# Patient Record
Sex: FEMALE | Race: BLACK OR AFRICAN AMERICAN | HISPANIC OR LATINO | Employment: FULL TIME | URBAN - METROPOLITAN AREA
[De-identification: names, ages, dates, MRNs, and addresses within clinical notes are randomized per-mention and may not be internally consistent; named-entity substitution may affect disease eponyms.]

---

## 2022-10-17 ENCOUNTER — OFFICE VISIT (OUTPATIENT)
Dept: OBSTETRICS AND GYNECOLOGY | Facility: CLINIC | Age: 37
End: 2022-10-17

## 2022-10-17 VITALS
DIASTOLIC BLOOD PRESSURE: 65 MMHG | HEART RATE: 84 BPM | SYSTOLIC BLOOD PRESSURE: 108 MMHG | BODY MASS INDEX: 20.09 KG/M2 | WEIGHT: 128 LBS | HEIGHT: 67 IN

## 2022-10-17 DIAGNOSIS — Z30.09 ENCOUNTER FOR OTHER GENERAL COUNSELING OR ADVICE ON CONTRACEPTION: ICD-10-CM

## 2022-10-17 DIAGNOSIS — N92.1 BREAKTHROUGH BLEEDING ON DEPO PROVERA: ICD-10-CM

## 2022-10-17 DIAGNOSIS — Z01.419 ENCOUNTER FOR GYNECOLOGICAL EXAMINATION WITHOUT ABNORMAL FINDING: Primary | ICD-10-CM

## 2022-10-17 PROCEDURE — 99385 PREV VISIT NEW AGE 18-39: CPT | Performed by: OBSTETRICS & GYNECOLOGY

## 2022-10-17 PROCEDURE — 99213 OFFICE O/P EST LOW 20 MIN: CPT | Performed by: OBSTETRICS & GYNECOLOGY

## 2022-10-17 PROCEDURE — 2014F MENTAL STATUS ASSESS: CPT | Performed by: OBSTETRICS & GYNECOLOGY

## 2022-10-17 RX ORDER — DOXYCYCLINE 100 MG/1
100 CAPSULE ORAL 2 TIMES DAILY
Qty: 20 CAPSULE | Refills: 0 | Status: SHIPPED | OUTPATIENT
Start: 2022-10-17 | End: 2022-10-27

## 2022-10-17 NOTE — PROGRESS NOTES
GYN Annual Exam     CC- Here for annual exam.     Mesha Sanchez is a 37 y.o. female who presents for annual well woman exam. Periods are regular every 28-30 days, lasting 7 days. Dysmenorrhea:moderate, occurring premenstrually and first 1-2 days of flow. Cyclic symptoms include none.   Pt c/o irregular bleeding for the past 3 mths. She received a birth control injection in [place] 3 mthd ago and has had irregular bleeding since.     Menarche at 14 years of age.   Reports on contraceptives for awhile   Was on IUD up until a few months ago. (approximately 5 months ago for pelvic pain, inflammation)   Then did injection   Started having irregular bleeding with this injection       OB History        6    Para   1    Term                AB   5    Living   1       SAB        IAB        Ectopic        Molar        Multiple        Live Births   1                Current contraception: none  History of abnormal Pap smear: no  Family history of uterine, colon or ovarian cancer: no  History of abnormal mammogram: no  Family history of breast cancer: no  Last Pap : unsure    Past Medical History:   Diagnosis Date   • Anxiety    • Fibroid    • Hyperemesis gravidarum        Past Surgical History:   Procedure Laterality Date   • CARPAL TUNNEL RELEASE           Current Outpatient Medications:   •  doxycycline (MONODOX) 100 MG capsule, Take 1 capsule by mouth 2 (Two) Times a Day for 10 days., Disp: 20 capsule, Rfl: 0    No Known Allergies    Social History     Tobacco Use   • Smoking status: Some Days     Types: Electronic Cigarette   Substance Use Topics   • Alcohol use: Never   • Drug use: Never       Family History   Problem Relation Age of Onset   • Diabetes Mother    • Hypertension Mother    • Breast cancer Neg Hx    • Ovarian cancer Neg Hx    • Uterine cancer Neg Hx    • Colon cancer Neg Hx    • Deep vein thrombosis Neg Hx    • Pulmonary embolism Neg Hx        Review of Systems   Constitutional:  "Negative for chills and fever.   Gastrointestinal: Negative for abdominal pain, constipation and diarrhea.   Genitourinary: Positive for menstrual problem. Negative for pelvic pain, vaginal bleeding and vaginal discharge.   All other systems reviewed and are negative.      /65   Pulse 84   Ht 170.2 cm (67\")   Wt 58.1 kg (128 lb)   BMI 20.05 kg/m²     Physical Exam  Constitutional:       General: She is not in acute distress.     Appearance: She is well-developed and normal weight.   Genitourinary:      Vulva normal.      Right Labia: No lesions or Bartholin's cyst.     Left Labia: No lesions or Bartholin's cyst.     No inguinal adenopathy present in the right or left side.     Vaginal bleeding (small amount of old blood ) present.      No vaginal discharge.        Right Adnexa: not tender, not full and no mass present.     Left Adnexa: not tender, not full and no mass present.     Cervical motion tenderness present.      No cervical friability.      Uterus is tender.      Uterus is not enlarged.      No uterine mass detected.     Uterus is anteverted.   Breasts:     Right: No inverted nipple, mass or nipple discharge.      Left: No inverted nipple, mass or nipple discharge.   HENT:      Head: Normocephalic and atraumatic.      Nose: Nose normal.   Eyes:      Conjunctiva/sclera: Conjunctivae normal.      Pupils: Pupils are equal, round, and reactive to light.   Neck:      Thyroid: No thyromegaly.   Cardiovascular:      Rate and Rhythm: Normal rate and regular rhythm.      Heart sounds: Normal heart sounds. No murmur heard.  Pulmonary:      Effort: Pulmonary effort is normal. No respiratory distress.      Breath sounds: Normal breath sounds.   Abdominal:      General: Abdomen is flat. There is no distension.      Palpations: Abdomen is soft.      Tenderness: There is no abdominal tenderness.   Musculoskeletal:         General: No deformity. Normal range of motion.      Cervical back: Normal range of motion " and neck supple.      Right lower leg: No edema.      Left lower leg: No edema.   Lymphadenopathy:      Lower Body: No right inguinal adenopathy. No left inguinal adenopathy.   Neurological:      Mental Status: She is alert and oriented to person, place, and time.   Skin:     General: Skin is warm and dry.      Findings: No erythema.   Psychiatric:         Behavior: Behavior normal.         Thought Content: Thought content normal.         Judgment: Judgment normal.   Vitals reviewed. Exam conducted with a chaperone present.               Assessment     Diagnoses and all orders for this visit:    1. Encounter for gynecological examination without abnormal finding (Primary)  -     IGP, Apt HPV,rfx 16 / 18,45    2. Breakthrough bleeding on depo provera  -     Urine Culture - , Urine, Clean Catch  -     Chlamydia trachomatis, Neisseria gonorrhoeae, Trichomonas vaginalis, PCR - Swab, Cervix  -     US Non-ob Transvaginal    3. Encounter for other general counseling or advice on contraception    Other orders  -     doxycycline (MONODOX) 100 MG capsule; Take 1 capsule by mouth 2 (Two) Times a Day for 10 days.  Dispense: 20 capsule; Refill: 0    1) GYN exam   Expectations reviewed.     2) BTB/AUB   Reports removed IUD 5 months ago due to inflammation   Told she could be allergic to the copper (inflammation in tubes)   Given injection (suspect depo-provera) - all in geo  Remote history of fibroids   Tender on exam   Check cultures of urine and vagina  Check ultrasound   Empiric doxycycline for inflammation   Return for next step     3) Contraceptive options   Continue DMPA, trial IUD (with progesterone) or Nexplaon - but concern with bleeding pattern   Not candidate with age for OCP or NuvaRign (smoker)      Plan     1) Breast Health - Clinical breast exam yearly, Discussed American cancer society recommendations for breast cancer screening, and Self breast awareness monthly  2) Pap - updated today   3) Smoking status-  cessation encouraged.   4) Encouraged to be wary of information obtained via social media and internet based on source and search.  5) Follow up prn and one year.       Russ Obrien MD   10/17/2022  15:36 EDT

## 2022-10-19 ENCOUNTER — TELEPHONE (OUTPATIENT)
Dept: OBSTETRICS AND GYNECOLOGY | Facility: CLINIC | Age: 37
End: 2022-10-19

## 2022-10-19 LAB
BACTERIA UR CULT: NO GROWTH
BACTERIA UR CULT: NORMAL
C TRACH RRNA SPEC QL NAA+PROBE: NEGATIVE
N GONORRHOEA RRNA SPEC QL NAA+PROBE: NEGATIVE
T VAGINALIS RRNA SPEC QL NAA+PROBE: NEGATIVE

## 2022-10-19 NOTE — TELEPHONE ENCOUNTER
----- Message from Russ Obrien MD sent at 10/19/2022  8:59 AM EDT -----  Georgiana, normal urine culture. Please let her know. Thanks Dr. Obrien

## 2022-10-19 NOTE — TELEPHONE ENCOUNTER
----- Message from Russ Obrien MD sent at 10/19/2022  4:34 PM EDT -----  Georgiana, please let her know the STD screen for Chlamydia, Gonorrhea and trichomoniasis is negative. Thanks, Dr. Obrien

## 2022-10-21 LAB
CYTOLOGIST CVX/VAG CYTO: NORMAL
CYTOLOGY CVX/VAG DOC CYTO: NORMAL
CYTOLOGY CVX/VAG DOC THIN PREP: NORMAL
DX ICD CODE: NORMAL
HIV 1 & 2 AB SER-IMP: NORMAL
HPV I/H RISK 4 DNA CVX QL PROBE+SIG AMP: NEGATIVE
OTHER STN SPEC: NORMAL
STAT OF ADQ CVX/VAG CYTO-IMP: NORMAL

## 2022-11-01 ENCOUNTER — TELEPHONE (OUTPATIENT)
Dept: OBSTETRICS AND GYNECOLOGY | Facility: CLINIC | Age: 37
End: 2022-11-01

## 2022-11-01 NOTE — TELEPHONE ENCOUNTER
Georgiana,     GYN ultrasound   Done for bleeding,   Per her possible history of hydrosalpinx and fibroids  Exam was normal.   No evidence of either of above.   Normal appearing uterus.     Keep follow up in December,     Thanks,   Dr. Obrien

## 2022-12-14 ENCOUNTER — PROCEDURE VISIT (OUTPATIENT)
Dept: OBSTETRICS AND GYNECOLOGY | Facility: CLINIC | Age: 37
End: 2022-12-14

## 2022-12-14 VITALS
HEIGHT: 67 IN | WEIGHT: 130 LBS | SYSTOLIC BLOOD PRESSURE: 98 MMHG | DIASTOLIC BLOOD PRESSURE: 57 MMHG | HEART RATE: 79 BPM | BODY MASS INDEX: 20.4 KG/M2

## 2022-12-14 DIAGNOSIS — Z30.430 ENCOUNTER FOR INSERTION OF MIRENA IUD: Primary | ICD-10-CM

## 2022-12-14 LAB
B-HCG UR QL: NEGATIVE
EXPIRATION DATE: NORMAL
INTERNAL NEGATIVE CONTROL: NEGATIVE
INTERNAL POSITIVE CONTROL: POSITIVE
Lab: NORMAL

## 2022-12-14 PROCEDURE — 81025 URINE PREGNANCY TEST: CPT | Performed by: OBSTETRICS & GYNECOLOGY

## 2022-12-14 PROCEDURE — 58300 INSERT INTRAUTERINE DEVICE: CPT | Performed by: OBSTETRICS & GYNECOLOGY

## 2022-12-14 NOTE — PROGRESS NOTES
Procedure: Mirena Intrauterine device insertion    Pre procedure indication 1) Desires Mirena  Post procedure indication 1) Desires Mirena     Brief Urine Lab Results  (Last result in the past 365 days)      Color   Clarity   Blood   Leuk Est   Nitrite   Protein   CREAT   Urine HCG        12/14/22 1511               Negative               The risks, benefits, and alternatives to Mirena were explained at length with the patient. All her questions were answered and consents were signed.LOT# [id number], exp 12/30/2024. NDC# [id number]    The patient was placed in a dorsal lithotomy position on the examining table in [facility]. A bimanual exam confirmed the uterus was normal in size, retroverted. A warmed metal speculum was inserted into the vagina and the cervix was brought into view.    The cervix was prepped with Betadine. The anterior lip was grasped with a single-tooth tenaculum. The endometrial cavity was then sounded to 7 cm without use of a dilator. This sealed Mirena package was opened and the Mirena was removed in a sterile fashion.    The upper edge of the depth setting the flange was set at a uterine sound measured. The  was then carefully advanced to the cervical canal into the uterus to the level of the fundus. This was then backed off about 1.5-2 cm to allow sufficient space for the arms to open. The slider was then retracted about 1 cm and deployed the device. The device was then gently advanced to the fundus. The Mirena was then released by pulling the slider down all the way. The  was removed carefully from the uterus. The threads were then cut leaving 2-3 cm visible outside of the cervix.  The single-tooth tenaculum was removed from the anterior lip. Good hemostasis was noted.     All other instruments were removed from the vagina.   There were no complications.  The patient tolerated the procedure well with a minimal amount of discomfort.    The patient was counseled about the need to  return in 2 weeks for string check.     She was counseled about the need to use a backup method of contraception such as condoms until her post insertion exam was performed. The patient verbalized understanding that the Mirena will need to be removed/replaced after 5 years. The patient is counseled to contact us if she has any significant or increasing bleeding, pain, fever, chills, or other concerns. She is instructed to see a doctor right away if she believes that she may be pregnant at any time with the Mirena in place.    Russ Obrien MD  12/14/2022  15:46 EST

## 2023-01-18 ENCOUNTER — TELEPHONE (OUTPATIENT)
Dept: OBSTETRICS AND GYNECOLOGY | Facility: CLINIC | Age: 38
End: 2023-01-18

## 2023-01-18 ENCOUNTER — OFFICE VISIT (OUTPATIENT)
Dept: OBSTETRICS AND GYNECOLOGY | Facility: CLINIC | Age: 38
End: 2023-01-18
Payer: COMMERCIAL

## 2023-01-18 VITALS
DIASTOLIC BLOOD PRESSURE: 65 MMHG | BODY MASS INDEX: 19.93 KG/M2 | HEART RATE: 78 BPM | WEIGHT: 127 LBS | HEIGHT: 67 IN | SYSTOLIC BLOOD PRESSURE: 109 MMHG

## 2023-01-18 DIAGNOSIS — Z97.5 BREAKTHROUGH BLEEDING WITH IUD: ICD-10-CM

## 2023-01-18 DIAGNOSIS — N92.1 BREAKTHROUGH BLEEDING WITH IUD: ICD-10-CM

## 2023-01-18 DIAGNOSIS — Z30.431 IUD CHECK UP: Primary | ICD-10-CM

## 2023-01-18 DIAGNOSIS — T83.32XA INTRAUTERINE CONTRACEPTIVE DEVICE THREADS LOST, INITIAL ENCOUNTER: ICD-10-CM

## 2023-01-18 PROCEDURE — 99213 OFFICE O/P EST LOW 20 MIN: CPT | Performed by: OBSTETRICS & GYNECOLOGY

## 2023-01-18 NOTE — PROGRESS NOTES
"Subjective    is a 37 y.o. female following up from IUD insertion on 2022. Pt c/o pelvic pain and dark brown bleeding with an odor after intercourse.     Chief Complaint   Patient presents with   • Follow-up        HPI     37 y.o.    IUD check up from December   Having bleeding and pelvic pain post placement       Review of Systems   Constitutional: Negative for fever.   Gastrointestinal: Negative for abdominal pain.   Genitourinary: Positive for pelvic pain and vaginal bleeding. Negative for dysuria and vaginal discharge.        Objective   /65   Pulse 78   Ht 170.2 cm (67\")   Wt 57.6 kg (127 lb)   BMI 19.89 kg/m²   Physical Exam  Constitutional:       General: She is not in acute distress.     Appearance: Normal appearance. She is well-developed and normal weight.   Genitourinary:      Right Labia: No lesions or Bartholin's cyst.     Left Labia: No lesions or Bartholin's cyst.     No inguinal adenopathy present in the right or left side.     No vaginal discharge or bleeding.        Right Adnexa: not tender, not full and no mass present.     Left Adnexa: not tender, not full and no mass present.     No cervical motion tenderness or friability.      No IUD strings visualized.      Uterus is not enlarged or tender.      No uterine mass detected.     Uterus is retroverted.   Abdominal:      General: Abdomen is flat. There is no distension.      Palpations: Abdomen is soft.      Tenderness: There is no abdominal tenderness.   Lymphadenopathy:      Lower Body: No right inguinal adenopathy. No left inguinal adenopathy.   Neurological:      Mental Status: She is alert.   Vitals reviewed. Exam conducted with a chaperone present.          Assessment/Plan   Diagnoses and all orders for this visit:    1. IUD check up (Primary)  -     US Non-ob Transvaginal    2. Breakthrough bleeding with IUD  -     US Non-ob Transvaginal    3. Intrauterine contraceptive device threads lost, initial encounter  -     US " Non-ob Transvaginal    Having some difficulty with IUD   Not sure if adjustment or placement issue   No strings on exam so check ultrasound to confirm in uterus,   Follow up based on those results.     Russ Obrien MD   1/18/2023  13:13 EST

## 2023-01-19 NOTE — TELEPHONE ENCOUNTER
Georgiana,     Post visit did GYN scan   IUD is in place, so suspect her issues are just her body adjusting to the device. I would try and give it 3 months total. If not better at that point should remove if bothering her bad enough and we can consider other options.   But for now okay to leave in place.     Thanks,   Dr. Obrien

## 2023-10-18 ENCOUNTER — OFFICE VISIT (OUTPATIENT)
Dept: OBSTETRICS AND GYNECOLOGY | Facility: CLINIC | Age: 38
End: 2023-10-18
Payer: COMMERCIAL

## 2023-10-18 VITALS
DIASTOLIC BLOOD PRESSURE: 64 MMHG | HEART RATE: 73 BPM | WEIGHT: 126 LBS | BODY MASS INDEX: 19.78 KG/M2 | HEIGHT: 67 IN | SYSTOLIC BLOOD PRESSURE: 98 MMHG

## 2023-10-18 DIAGNOSIS — Z01.419 ENCOUNTER FOR GYNECOLOGICAL EXAMINATION WITHOUT ABNORMAL FINDING: Primary | ICD-10-CM

## 2023-10-18 DIAGNOSIS — N63.12 MASS OF UPPER INNER QUADRANT OF RIGHT BREAST: ICD-10-CM

## 2023-10-18 DIAGNOSIS — N64.4 BREAST TENDERNESS IN FEMALE: ICD-10-CM

## 2023-10-18 NOTE — Clinical Note
Diane, can we set her up with [facility] for MMG and ultrasound due to new breast mass. Thanks, Dr. Obrien

## 2023-10-18 NOTE — PROGRESS NOTES
"GYN Annual Exam     CC- Here for annual exam.     Mesha Sanchez is a 38 y.o. female who presents for annual well woman exam. Periods are  absent due to IUD.   Pt c/o breast tenderness around the time she should have her period. She has never had breast tenderness before. Also c/o lump on the R breast.     OB History          6    Para   1    Term                AB   5    Living   1         SAB        IAB        Ectopic        Molar        Multiple        Live Births   1                Current contraception: IUD- inserted 2022  History of abnormal Pap smear: No  Family history of uterine, colon or ovarian cancer: no  History of abnormal mammogram: no  Family history of breast cancer: no  Last Pap : 10/17/2022 NIL HPV neg     Past Medical History:   Diagnosis Date    Anxiety 2020    Fibroid     Hyperemesis gravidarum        Past Surgical History:   Procedure Laterality Date    CARPAL TUNNEL RELEASE         No current outpatient medications on file.    No Known Allergies    Social History     Tobacco Use    Smoking status: Some Days     Types: Electronic Cigarette   Substance Use Topics    Alcohol use: Never    Drug use: Never       Family History   Problem Relation Age of Onset    Diabetes Mother     Hypertension Mother     Breast cancer Neg Hx     Ovarian cancer Neg Hx     Uterine cancer Neg Hx     Colon cancer Neg Hx     Deep vein thrombosis Neg Hx     Pulmonary embolism Neg Hx        Review of Systems   Constitutional:  Negative for chills and fever.   Gastrointestinal:  Negative for abdominal pain, constipation and diarrhea.   Genitourinary:  Positive for breast lump and breast pain. Negative for menstrual problem, pelvic pain, vaginal bleeding and vaginal discharge.   All other systems reviewed and are negative.      BP 98/64   Pulse 73   Ht 170.2 cm (67\")   Wt 57.2 kg (126 lb)   BMI 19.73 kg/m²     Physical Exam  Constitutional:       General: She is not in acute distress.   "   Appearance: She is well-developed and normal weight.   Genitourinary:      Vulva normal.      Right Labia: No lesions or Bartholin's cyst.     Left Labia: No lesions or Bartholin's cyst.     No inguinal adenopathy present in the right or left side.     No vaginal discharge or bleeding.        Right Adnexa: not tender, not full and no mass present.     Left Adnexa: not tender, not full and no mass present.     No cervical motion tenderness or friability.      No IUD strings visualized.      Uterus is not enlarged or tender.      No uterine mass detected.     Uterus is retroverted.   Breasts:     Right: Mass (inner, upper quadrant at edge 1 cm mobile mass) present. No swelling, inverted nipple, nipple discharge, skin change or tenderness.      Left: No swelling, inverted nipple, mass, nipple discharge, skin change or tenderness.   HENT:      Head: Normocephalic and atraumatic.      Nose: Nose normal.   Eyes:      Conjunctiva/sclera: Conjunctivae normal.      Pupils: Pupils are equal, round, and reactive to light.   Neck:      Thyroid: No thyromegaly.   Cardiovascular:      Rate and Rhythm: Normal rate and regular rhythm.      Heart sounds: Normal heart sounds. No murmur heard.  Pulmonary:      Effort: Pulmonary effort is normal. No respiratory distress.      Breath sounds: Normal breath sounds.   Abdominal:      General: Abdomen is flat. There is no distension.      Palpations: Abdomen is soft.      Tenderness: There is no abdominal tenderness.   Musculoskeletal:         General: No deformity. Normal range of motion.      Cervical back: Normal range of motion and neck supple.      Right lower leg: No edema.      Left lower leg: No edema.   Lymphadenopathy:      Lower Body: No right inguinal adenopathy. No left inguinal adenopathy.   Neurological:      Mental Status: She is alert and oriented to person, place, and time.   Skin:     General: Skin is warm and dry.      Findings: No erythema.   Psychiatric:          Behavior: Behavior normal.         Thought Content: Thought content normal.         Judgment: Judgment normal.   Vitals reviewed. Exam conducted with a chaperone present.               Assessment     Diagnoses and all orders for this visit:    1. Encounter for gynecological examination without abnormal finding (Primary)    2. Breast tenderness in female  -     Mammo Screening Digital Tomosynthesis Bilateral With CAD  -     US breast right limited    3. Mass of upper inner quadrant of right breast  -     Mammo Screening Digital Tomosynthesis Bilateral With CAD  -     US breast right limited    1) GYN exam   CBE as below, see plan   Pap up to date  On IUD - no strings, but ultrasound earlier this year confirmed in location     2) Breast tenderness and new mass  Mass at edge of right breast upper, inner quadrant  Discussed evaluation with MMG and ultrasound   No family hx or concerning characteristics of this currently   Expectations reviewed.      Plan     1) Breast Health - Clinical breast exam yearly, Discussed American cancer society recommendations for breast cancer screening, and Self breast awareness monthly  2) Pap - up to date   3) Smoking status- cessation encouraged   4) Encouraged between 7-8 hours of good sleep per night.   5) Follow up prn and one year.       Russ Obrien MD   10/18/2023  10:35 EDT

## 2024-08-21 ENCOUNTER — OFFICE VISIT (OUTPATIENT)
Dept: OBSTETRICS AND GYNECOLOGY | Facility: CLINIC | Age: 39
End: 2024-08-21
Payer: COMMERCIAL

## 2024-08-21 VITALS
SYSTOLIC BLOOD PRESSURE: 101 MMHG | WEIGHT: 126 LBS | HEART RATE: 58 BPM | BODY MASS INDEX: 19.78 KG/M2 | HEIGHT: 67 IN | DIASTOLIC BLOOD PRESSURE: 60 MMHG

## 2024-08-21 DIAGNOSIS — N89.8 VAGINAL DISCHARGE: Primary | ICD-10-CM

## 2024-08-21 PROCEDURE — 99213 OFFICE O/P EST LOW 20 MIN: CPT | Performed by: OBSTETRICS & GYNECOLOGY

## 2024-08-21 PROCEDURE — 1160F RVW MEDS BY RX/DR IN RCRD: CPT | Performed by: OBSTETRICS & GYNECOLOGY

## 2024-08-21 PROCEDURE — 1159F MED LIST DOCD IN RCRD: CPT | Performed by: OBSTETRICS & GYNECOLOGY

## 2024-08-21 RX ORDER — FLUCONAZOLE 150 MG/1
150 TABLET ORAL
Qty: 2 TABLET | Refills: 3 | Status: SHIPPED | OUTPATIENT
Start: 2024-08-21 | End: 2024-08-25

## 2024-08-21 NOTE — PROGRESS NOTES
"Subjective    is a 39 y.o. female c/o whitish yellow d/c with an odor for the past 3 mths. She treats with Nystatin and Flagyl suppositories but symptoms come back.     Chief Complaint   Patient presents with    Vaginal Discharge        HPI    39 y.o.    Vaginal discharge on and off   Has treated but returns       Review of Systems   Constitutional:  Negative for fever.   Gastrointestinal:  Negative for abdominal pain.   Genitourinary:  Positive for vaginal discharge. Negative for pelvic pain and vaginal bleeding.        Objective   /60   Pulse 58   Ht 170.2 cm (67\")   Wt 57.2 kg (126 lb)   BMI 19.73 kg/m²   Physical Exam  Constitutional:       General: She is not in acute distress.     Appearance: Normal appearance. She is well-developed and normal weight.   Genitourinary:      Right Labia: No lesions or Bartholin's cyst.     Left Labia: No lesions or Bartholin's cyst.     No inguinal adenopathy present in the right or left side.     Vaginal discharge (thick white discharge) present.      No vaginal bleeding.        Right Adnexa: not tender, not full and no mass present.     Left Adnexa: not tender, not full and no mass present.     No cervical motion tenderness or friability.      No IUD strings visualized.      Uterus is not enlarged or tender.      No uterine mass detected.     Uterus is anteverted.   Abdominal:      General: Abdomen is flat. There is no distension.      Palpations: Abdomen is soft.      Tenderness: There is no abdominal tenderness.   Lymphadenopathy:      Lower Body: No right inguinal adenopathy. No left inguinal adenopathy.   Neurological:      Mental Status: She is alert.   Vitals reviewed. Exam conducted with a chaperone present.          Assessment/Plan   Diagnoses and all orders for this visit:    1. Vaginal discharge (Primary)  -     NuSwab VG+ - , Cervix    Other orders  -     fluconazole (DIFLUCAN) 150 MG tablet; Take 1 tablet by mouth Every 3 (Three) Days for 2 doses.  " Dispense: 2 tablet; Refill: 3    1) Recurrent vaginal discharge not responsive to over the counter treatment   Send nuswab to ensure treating correctly   Clinically consistent with yeast  Will start empiric treatment while awaiting results.   Follow up if does not resolve   Did not have pharmacy, so written Rx given     Strings not seen   But ultrasound after insertion showed in correct position and nothing has changed to suggest not in place     Russ Obrien MD   8/21/2024  13:41 EDT

## 2024-08-23 LAB
A VAGINAE DNA VAG QL NAA+PROBE: ABNORMAL SCORE
BVAB2 DNA VAG QL NAA+PROBE: ABNORMAL SCORE
C ALBICANS DNA VAG QL NAA+PROBE: POSITIVE
C GLABRATA DNA VAG QL NAA+PROBE: NEGATIVE
C TRACH DNA SPEC QL NAA+PROBE: NEGATIVE
MEGA1 DNA VAG QL NAA+PROBE: ABNORMAL SCORE
N GONORRHOEA DNA VAG QL NAA+PROBE: NEGATIVE
T VAGINALIS DNA VAG QL NAA+PROBE: NEGATIVE

## 2024-12-18 ENCOUNTER — OFFICE VISIT (OUTPATIENT)
Dept: OBSTETRICS AND GYNECOLOGY | Facility: CLINIC | Age: 39
End: 2024-12-18
Payer: COMMERCIAL

## 2024-12-18 VITALS
HEART RATE: 77 BPM | HEIGHT: 67 IN | SYSTOLIC BLOOD PRESSURE: 99 MMHG | DIASTOLIC BLOOD PRESSURE: 62 MMHG | WEIGHT: 134 LBS | BODY MASS INDEX: 21.03 KG/M2

## 2024-12-18 DIAGNOSIS — N89.8 VAGINAL DISCHARGE: ICD-10-CM

## 2024-12-18 DIAGNOSIS — Z01.419 ENCOUNTER FOR GYNECOLOGICAL EXAMINATION WITHOUT ABNORMAL FINDING: Primary | ICD-10-CM

## 2024-12-18 DIAGNOSIS — Z86.32 HISTORY OF GESTATIONAL DIABETES: ICD-10-CM

## 2024-12-18 DIAGNOSIS — R10.2 PELVIC PAIN: ICD-10-CM

## 2024-12-18 NOTE — PROGRESS NOTES
"GYN Annual Exam     CC- Here for annual exam.     Mesha Sanchez is a 39 y.o. female who presents for annual well woman exam. Periods are  absent due to IUD.       She c/o left lower abdominal pain that comes and goes. She has this 3-4 times a month. She has painful intercourse.     She c/o reoccurring yeast infections.   She would like a referral for PCP to be tested for DM as this runs in her family and she had gestational diabetes.     OB History          6    Para   1    Term                AB   5    Living   1         SAB        IAB        Ectopic        Molar        Multiple        Live Births   1                Current contraception: IUD- inserted 2022  History of abnormal Pap smear: no  Family history of uterine, colon or ovarian cancer: no  History of abnormal mammogram: no  Family history of breast cancer: no  Pap : 10/17/2022 NIL HPV neg    Past Medical History:   Diagnosis Date    Anxiety 2020    Fibroid     Hyperemesis gravidarum        Past Surgical History:   Procedure Laterality Date    CARPAL TUNNEL RELEASE         No current outpatient medications on file.    No Known Allergies    Social History     Tobacco Use    Smoking status: Some Days     Types: Electronic Cigarette   Substance Use Topics    Alcohol use: Never    Drug use: Never       Family History   Problem Relation Age of Onset    Diabetes Mother     Hypertension Mother     Breast cancer Neg Hx     Ovarian cancer Neg Hx     Uterine cancer Neg Hx     Colon cancer Neg Hx     Deep vein thrombosis Neg Hx     Pulmonary embolism Neg Hx        Review of Systems   Constitutional:  Negative for chills and fever.   Gastrointestinal:  Negative for abdominal pain, constipation and diarrhea.   Genitourinary:  Positive for dyspareunia, pelvic pain and vaginal discharge. Negative for menstrual problem and vaginal bleeding.   All other systems reviewed and are negative.      BP 99/62   Pulse 77   Ht 170.2 cm (67\")   Wt " 60.8 kg (134 lb)   BMI 20.99 kg/m²     Physical Exam  Constitutional:       General: She is not in acute distress.     Appearance: She is well-developed and normal weight.   Genitourinary:      Vulva normal.      Right Labia: No lesions or Bartholin's cyst.     Left Labia: No lesions or Bartholin's cyst.     No inguinal adenopathy present in the right or left side.     No vaginal discharge or bleeding.        Right Adnexa: not tender, not full and no mass present.     Left Adnexa: not tender, not full and no mass present.     No cervical motion tenderness or friability.      No IUD strings visualized.      Uterus is not enlarged or tender.      No uterine mass detected.     Uterus is anteverted.   Breasts:     Right: No inverted nipple, mass or nipple discharge.      Left: No inverted nipple, mass or nipple discharge.   HENT:      Head: Normocephalic and atraumatic.      Nose: Nose normal.   Eyes:      Conjunctiva/sclera: Conjunctivae normal.      Pupils: Pupils are equal, round, and reactive to light.   Neck:      Thyroid: No thyromegaly.   Cardiovascular:      Rate and Rhythm: Normal rate and regular rhythm.      Heart sounds: Normal heart sounds. No murmur heard.  Pulmonary:      Effort: Pulmonary effort is normal. No respiratory distress.      Breath sounds: Normal breath sounds.   Abdominal:      General: Abdomen is flat. There is no distension.      Palpations: Abdomen is soft.      Tenderness: There is no abdominal tenderness.   Musculoskeletal:         General: No deformity. Normal range of motion.      Cervical back: Normal range of motion and neck supple.      Right lower leg: No edema.      Left lower leg: No edema.   Lymphadenopathy:      Lower Body: No right inguinal adenopathy. No left inguinal adenopathy.   Neurological:      Mental Status: She is alert and oriented to person, place, and time.   Skin:     General: Skin is warm and dry.      Findings: No erythema.   Psychiatric:         Behavior:  Behavior normal.         Thought Content: Thought content normal.         Judgment: Judgment normal.   Vitals reviewed. Exam conducted with a chaperone present.               Assessment     Diagnoses and all orders for this visit:    1. Encounter for gynecological examination without abnormal finding (Primary)    2. Vaginal discharge  -     NuSwab VG+ - , Cervix    3. History of gestational diabetes  -     Ambulatory Referral to Family Practice    4. Pelvic pain  -     US Non-ob Transvaginal  -     Urine Culture - , Urine, Clean Catch  -     NuSwab VG+ - , Cervix    1) GYN exam   BP good   Screening up to date     2) Recurrent vaginal discharge/yeast  Had confirmed in August  Recheck today   If recurrent could do extended treatment but wants referral to PCP to check for Type II DM    3) Pelvic pain   Has IUD with good suppression   Strings not seen   Checking ultrasound and cultures  If negative   Consider GnRH, Dx LSC or TLH   Discussed removal of IUD first as this seems to be coincidental to pain      Plan     1) Breast Health - Clinical breast exam yearly, Discussed American cancer society recommendations for breast cancer screening, and Self breast awareness monthly  CBE left on bra - Not done  2) Pap - up to date   3) Smoking status- non-smoker   4) Encouraged between 7-8 hours of good sleep per night.   5) Follow up prn and one year.       Russ Obrien MD   12/18/2024  13:32 EST

## 2024-12-20 ENCOUNTER — TELEPHONE (OUTPATIENT)
Dept: OBSTETRICS AND GYNECOLOGY | Facility: CLINIC | Age: 39
End: 2024-12-20
Payer: COMMERCIAL

## 2024-12-20 LAB
A VAGINAE DNA VAG QL NAA+PROBE: NORMAL SCORE
BACTERIA UR CULT: NORMAL
BACTERIA UR CULT: NORMAL
BVAB2 DNA VAG QL NAA+PROBE: NORMAL SCORE
C ALBICANS DNA VAG QL NAA+PROBE: NEGATIVE
C GLABRATA DNA VAG QL NAA+PROBE: NEGATIVE
C TRACH DNA SPEC QL NAA+PROBE: NEGATIVE
MEGA1 DNA VAG QL NAA+PROBE: NORMAL SCORE
N GONORRHOEA DNA VAG QL NAA+PROBE: NEGATIVE
T VAGINALIS DNA VAG QL NAA+PROBE: NEGATIVE

## 2024-12-20 NOTE — TELEPHONE ENCOUNTER
----- Message from Russ Obrien sent at 12/20/2024  9:02 AM EST -----  [place], negative vaginal culture for gonorrhea, chlamydia, trichomoniasis, along with yeast and bacterial vaginosis, please review. Thanks Dr. Obrien

## 2024-12-20 NOTE — PROGRESS NOTES
[place]iana, negative vaginal culture for gonorrhea, chlamydia, trichomoniasis, along with yeast and bacterial vaginosis, please review. Thanks Dr. Obrien

## 2025-01-21 ENCOUNTER — OFFICE VISIT (OUTPATIENT)
Dept: FAMILY MEDICINE CLINIC | Facility: CLINIC | Age: 40
End: 2025-01-21
Payer: COMMERCIAL

## 2025-01-21 VITALS
HEART RATE: 66 BPM | HEIGHT: 67 IN | TEMPERATURE: 97.8 F | SYSTOLIC BLOOD PRESSURE: 122 MMHG | RESPIRATION RATE: 16 BRPM | DIASTOLIC BLOOD PRESSURE: 76 MMHG | BODY MASS INDEX: 21.79 KG/M2 | WEIGHT: 138.8 LBS | OXYGEN SATURATION: 100 %

## 2025-01-21 DIAGNOSIS — Z86.32 HISTORY OF GESTATIONAL DIABETES: ICD-10-CM

## 2025-01-21 DIAGNOSIS — Z83.3 FAMILY HISTORY OF DIABETES MELLITUS: ICD-10-CM

## 2025-01-21 DIAGNOSIS — M79.641 CHRONIC HAND PAIN, RIGHT: Chronic | ICD-10-CM

## 2025-01-21 DIAGNOSIS — G89.29 CHRONIC HAND PAIN, RIGHT: Chronic | ICD-10-CM

## 2025-01-21 DIAGNOSIS — Z13.220 LIPID SCREENING: ICD-10-CM

## 2025-01-21 DIAGNOSIS — Z00.00 ENCOUNTER FOR PREVENTIVE HEALTH EXAMINATION: ICD-10-CM

## 2025-01-21 DIAGNOSIS — Z11.59 NEED FOR HEPATITIS C SCREENING TEST: ICD-10-CM

## 2025-01-21 DIAGNOSIS — Z98.890 HISTORY OF CARPAL TUNNEL SURGERY OF RIGHT WRIST: ICD-10-CM

## 2025-01-21 DIAGNOSIS — Z76.89 ENCOUNTER TO ESTABLISH CARE: Primary | ICD-10-CM

## 2025-01-21 DIAGNOSIS — F51.01 PRIMARY INSOMNIA: Chronic | ICD-10-CM

## 2025-01-21 PROCEDURE — 99204 OFFICE O/P NEW MOD 45 MIN: CPT | Performed by: STUDENT IN AN ORGANIZED HEALTH CARE EDUCATION/TRAINING PROGRAM

## 2025-01-21 RX ORDER — HYDROXYZINE HYDROCHLORIDE 25 MG/1
50 TABLET, FILM COATED ORAL NIGHTLY PRN
Qty: 60 TABLET | Refills: 1 | Status: SHIPPED | OUTPATIENT
Start: 2025-01-21 | End: 2025-03-22

## 2025-01-21 RX ORDER — ACETAMINOPHEN 500 MG
500 TABLET ORAL EVERY 8 HOURS PRN
Qty: 100 TABLET | Refills: 0 | Status: SHIPPED | OUTPATIENT
Start: 2025-01-21 | End: 2025-02-20

## 2025-01-21 NOTE — ASSESSMENT & PLAN NOTE
Discussed hand surgery referral.  Instructed patient to call the clinic if she does not hear about the hand doctor referral in 2 weeks from today, patient voiced understanding.

## 2025-01-21 NOTE — PROGRESS NOTES
"Chief Complaint  Establish Care and Hand Pain (POSSIBLE CARPAL TUNNEL SYSTEM; ONGOING FOR 1 YEAR)    Subjective        Mesha Sanchez presents to [facility]  History of Present Illness  40yo Estonian speaking female Patient was previously not seen by PCP at [facility], and patient is new to me and is here for establishment of care visit for chronic medical conditions including chronic right hand pain x 1 year.      Pt c/o right hand pain and difficulty opening the palm of the right hand; reports remote hx of CTS surgery of right hand in [place] 5 years ago.    Used Guinean video  for interpretation during the entire visit.  All patient questions answered and all concerns clarified prior to conclusion of the visit.   tablet and service provided by [facility].    Instructed patient to get the adult preventive immunization that are due at  the pharmacy, including - Tdap, flu, prevnar.  Patient complaining of family history of diabetes and ongoing couple family members and desires diabetes screening also because she has polyuria at this time.    Patient also complaining of insomnia and desires something for insomnia.  Hand Pain         Objective   Vital Signs:  /76 (BP Location: Left arm, Patient Position: Sitting, Cuff Size: Adult)   Pulse 66   Temp 97.8 °F (36.6 °C) (Temporal)   Resp 16   Ht 170.2 cm (67.01\")   Wt 63 kg (138 lb 12.8 oz)   SpO2 100%   BMI 21.73 kg/m²   Estimated body mass index is 21.73 kg/m² as calculated from the following:    Height as of this encounter: 170.2 cm (67.01\").    Weight as of this encounter: 63 kg (138 lb 12.8 oz).       BMI is within normal parameters. No other follow-up for BMI required.      Physical Exam  Constitutional:       Appearance: Normal appearance.   HENT:      Head: Normocephalic and atraumatic.   Eyes:      Conjunctiva/sclera: Conjunctivae normal.   Cardiovascular:      Rate " and Rhythm: Normal rate and regular rhythm.      Heart sounds: Normal heart sounds.      Comments: Radial pulses 2+ bilaterally  Pulmonary:      Effort: Pulmonary effort is normal.      Breath sounds: Normal breath sounds.   Abdominal:      General: Bowel sounds are normal.      Palpations: Abdomen is soft.      Comments: Non-tender   Musculoskeletal:      Comments: Pt able to open the right palm of hand but has to trouble leaving it open.    Right hand  slightly less than left hand  on exam.  Pain in the right elbow with the right wrist Phalen test.   Skin:     General: Skin is warm.   Neurological:      General: No focal deficit present.      Mental Status: She is alert and oriented to person, place, and time.   Psychiatric:         Mood and Affect: Mood normal.         Behavior: Behavior normal.        Result Review :    The following data was reviewed by: LAKSHMI Pollard on 01/21/2025:                Data reviewed : Consultant notes reviewed last OB/GYN consult note from December 2024 per gynecology note patient has a history of gestational diabetes and referred to family practice for further evaluation.             Assessment and Plan     Diagnoses and all orders for this visit:    1. Encounter to establish care (Primary)    2. Encounter for preventive health examination  Assessment & Plan:  Discussed the importance of healthy diet, nutrition, and lifestyle. Recommend low salt, fat/cholesterol diet and avoid concentrated sweets. Encouraged DASH diet along with fresh fruits & vegetables and low fat dairy products. Counseled patient to exercise/walk as tolerated. Avoid tobacco and alcohol use.      Orders:  -     CBC & Differential  -     Comprehensive Metabolic Panel    3. Chronic hand pain, right  Assessment & Plan:  Discussed hand surgery referral.  Instructed patient to call the clinic if she does not hear about the hand doctor referral in 2 weeks from today, patient voiced  understanding.    Orders:  -     Ambulatory Referral to Hand Surgery  -     acetaminophen (TYLENOL) 500 MG tablet; Take 1 tablet by mouth Every 8 (Eight) Hours As Needed for Mild Pain for up to 30 days.  Dispense: 100 tablet; Refill: 0  -     Diclofenac Sodium (VOLTAREN) 1 % gel gel; Apply 4 g topically to the appropriate area as directed 2 (Two) Times a Day As Needed (MSK pain) for up to 90 days.  Dispense: 100 g; Refill: 2    4. Lipid screening  -     Lipid Panel    5. Family history of diabetes mellitus  Assessment & Plan:  Discussed the importance of healthy diet, nutrition, and lifestyle. Recommend low salt, fat/cholesterol diet and avoid concentrated sweets. Encouraged DASH diet along with fresh fruits & vegetables and low fat dairy products. Counseled patient to exercise/walk as tolerated. Avoid tobacco and alcohol use.      Orders:  -     Hemoglobin A1c    6. Primary insomnia  Assessment & Plan:  Started hydroxyzine as needed at bedtime for insomnia.    Orders:  -     hydrOXYzine (ATARAX) 25 MG tablet; Take 2 tablets by mouth At Night As Needed for Itching, Allergies or Anxiety for up to 60 days.  Dispense: 60 tablet; Refill: 1    7. Need for hepatitis C screening test  -     Hepatitis C Antibody    8. History of carpal tunnel surgery of right wrist  Comments:  around 2020    9. History of gestational diabetes  -     Hemoglobin A1c        All chronic conditions have been addressed and treated by the practice or other specialists. Medications have been reconciled and refilled as appropriate. Reiterated compliance and timely follow up appointments. Side effects of all new and old medications reviewed with the patient and patient willing to accept all risks involved. Advised RTO if no improvement or worsening of symptoms or if any new complaints arise. Patient advised to follow up with clinic or call after diagnostic tests, if patient does not hear from office 3 days after the test completion.          Follow  Up     Return in about 6 months (around 7/21/2025) for Next scheduled follow up.  Patient was given instructions and counseling regarding her condition or for health maintenance advice. Please see specific information pulled into the AVS if appropriate.

## 2025-01-22 LAB
ALBUMIN SERPL-MCNC: 4.8 G/DL (ref 3.9–4.9)
ALP SERPL-CCNC: 76 IU/L (ref 44–121)
ALT SERPL-CCNC: 23 IU/L (ref 0–32)
AST SERPL-CCNC: 23 IU/L (ref 0–40)
BASOPHILS # BLD AUTO: 0.1 X10E3/UL (ref 0–0.2)
BASOPHILS NFR BLD AUTO: 1 %
BILIRUB SERPL-MCNC: 0.5 MG/DL (ref 0–1.2)
BUN SERPL-MCNC: 9 MG/DL (ref 6–20)
BUN/CREAT SERPL: 13 (ref 9–23)
CALCIUM SERPL-MCNC: 9.5 MG/DL (ref 8.7–10.2)
CHLORIDE SERPL-SCNC: 103 MMOL/L (ref 96–106)
CHOLEST SERPL-MCNC: 136 MG/DL (ref 100–199)
CO2 SERPL-SCNC: 23 MMOL/L (ref 20–29)
CREAT SERPL-MCNC: 0.68 MG/DL (ref 0.57–1)
EGFRCR SERPLBLD CKD-EPI 2021: 114 ML/MIN/1.73
EOSINOPHIL # BLD AUTO: 0.1 X10E3/UL (ref 0–0.4)
EOSINOPHIL NFR BLD AUTO: 1 %
ERYTHROCYTE [DISTWIDTH] IN BLOOD BY AUTOMATED COUNT: 11.5 % (ref 11.7–15.4)
GLOBULIN SER CALC-MCNC: 2.3 G/DL (ref 1.5–4.5)
GLUCOSE SERPL-MCNC: 89 MG/DL (ref 70–99)
HBA1C MFR BLD: 5.6 % (ref 4.8–5.6)
HCT VFR BLD AUTO: 44.6 % (ref 34–46.6)
HCV IGG SERPL QL IA: NON REACTIVE
HDLC SERPL-MCNC: 55 MG/DL
HGB BLD-MCNC: 14.8 G/DL (ref 11.1–15.9)
IMM GRANULOCYTES # BLD AUTO: 0 X10E3/UL (ref 0–0.1)
IMM GRANULOCYTES NFR BLD AUTO: 0 %
LDLC SERPL CALC-MCNC: 70 MG/DL (ref 0–99)
LYMPHOCYTES # BLD AUTO: 2.9 X10E3/UL (ref 0.7–3.1)
LYMPHOCYTES NFR BLD AUTO: 31 %
MCH RBC QN AUTO: 31.7 PG (ref 26.6–33)
MCHC RBC AUTO-ENTMCNC: 33.2 G/DL (ref 31.5–35.7)
MCV RBC AUTO: 96 FL (ref 79–97)
MONOCYTES # BLD AUTO: 0.6 X10E3/UL (ref 0.1–0.9)
MONOCYTES NFR BLD AUTO: 7 %
NEUTROPHILS # BLD AUTO: 5.6 X10E3/UL (ref 1.4–7)
NEUTROPHILS NFR BLD AUTO: 60 %
PLATELET # BLD AUTO: 248 X10E3/UL (ref 150–450)
POTASSIUM SERPL-SCNC: 3.9 MMOL/L (ref 3.5–5.2)
PROT SERPL-MCNC: 7.1 G/DL (ref 6–8.5)
RBC # BLD AUTO: 4.67 X10E6/UL (ref 3.77–5.28)
SODIUM SERPL-SCNC: 140 MMOL/L (ref 134–144)
TRIGL SERPL-MCNC: 50 MG/DL (ref 0–149)
VLDLC SERPL CALC-MCNC: 11 MG/DL (ref 5–40)
WBC # BLD AUTO: 9.3 X10E3/UL (ref 3.4–10.8)

## 2025-01-23 ENCOUNTER — TELEPHONE (OUTPATIENT)
Dept: FAMILY MEDICINE CLINIC | Facility: CLINIC | Age: 40
End: 2025-01-23
Payer: COMMERCIAL

## 2025-01-23 NOTE — TELEPHONE ENCOUNTER
----- Message from José Miguel Shin sent at 1/22/2025  4:30 PM EST -----  On recent labs blood count, liver kidney function, cholesterol, were normal.  Diabetes screening test was negative.  No diabetes detected.  Hepatitis C antibody test result was negative.

## 2025-01-23 NOTE — TELEPHONE ENCOUNTER
"Relay     \"On recent labs blood count, liver kidney function, cholesterol, were normal.   Diabetes screening test was negative.  No diabetes detected.   Hepatitis C antibody test result was negative. \"                "

## 2025-02-21 ENCOUNTER — TELEPHONE (OUTPATIENT)
Dept: OBSTETRICS AND GYNECOLOGY | Facility: CLINIC | Age: 40
End: 2025-02-21
Payer: COMMERCIAL

## 2025-06-09 ENCOUNTER — OFFICE VISIT (OUTPATIENT)
Dept: FAMILY MEDICINE CLINIC | Facility: CLINIC | Age: 40
End: 2025-06-09
Payer: COMMERCIAL

## 2025-06-09 VITALS
SYSTOLIC BLOOD PRESSURE: 112 MMHG | DIASTOLIC BLOOD PRESSURE: 72 MMHG | TEMPERATURE: 97.7 F | WEIGHT: 147.2 LBS | BODY MASS INDEX: 23.1 KG/M2 | OXYGEN SATURATION: 100 % | HEART RATE: 60 BPM | HEIGHT: 67 IN

## 2025-06-09 DIAGNOSIS — M25.521 RIGHT ELBOW PAIN: ICD-10-CM

## 2025-06-09 DIAGNOSIS — M77.11 LATERAL EPICONDYLITIS OF RIGHT ELBOW: ICD-10-CM

## 2025-06-09 DIAGNOSIS — Z98.890 HISTORY OF CARPAL TUNNEL SURGERY OF RIGHT WRIST: ICD-10-CM

## 2025-06-09 DIAGNOSIS — Z01.818 PREOPERATIVE CLEARANCE: ICD-10-CM

## 2025-06-09 DIAGNOSIS — M54.2 NECK PAIN: ICD-10-CM

## 2025-06-09 DIAGNOSIS — N60.01 BILATERAL BREAST CYSTS: ICD-10-CM

## 2025-06-09 DIAGNOSIS — M79.631 RIGHT FOREARM PAIN: ICD-10-CM

## 2025-06-09 DIAGNOSIS — M79.641 CHRONIC HAND PAIN, RIGHT: Primary | Chronic | ICD-10-CM

## 2025-06-09 DIAGNOSIS — G89.29 CHRONIC HAND PAIN, RIGHT: Primary | Chronic | ICD-10-CM

## 2025-06-09 DIAGNOSIS — N60.02 BILATERAL BREAST CYSTS: ICD-10-CM

## 2025-06-09 PROBLEM — Z13.220 LIPID SCREENING: Status: RESOLVED | Noted: 2025-01-21 | Resolved: 2025-06-09

## 2025-06-09 PROBLEM — Z00.00 ENCOUNTER FOR PREVENTIVE HEALTH EXAMINATION: Status: RESOLVED | Noted: 2025-01-21 | Resolved: 2025-06-09

## 2025-06-09 PROBLEM — Z11.59 NEED FOR HEPATITIS C SCREENING TEST: Status: RESOLVED | Noted: 2025-01-21 | Resolved: 2025-06-09

## 2025-06-09 PROBLEM — Z76.89 ENCOUNTER TO ESTABLISH CARE: Status: RESOLVED | Noted: 2025-01-21 | Resolved: 2025-06-09

## 2025-06-09 RX ORDER — HYDROXYZINE HYDROCHLORIDE 25 MG/1
25 TABLET, FILM COATED ORAL AS NEEDED
COMMUNITY
Start: 2025-01-21

## 2025-06-09 RX ORDER — BACLOFEN 10 MG/1
10 TABLET ORAL NIGHTLY PRN
Qty: 30 TABLET | Refills: 1 | Status: SHIPPED | OUTPATIENT
Start: 2025-06-09 | End: 2025-09-07

## 2025-06-09 RX ORDER — ACETAMINOPHEN 500 MG
500 TABLET ORAL EVERY 8 HOURS PRN
COMMUNITY
Start: 2025-01-21

## 2025-06-09 NOTE — PROGRESS NOTES
"Chief Complaint  Hand Pain (Right hand pain/ pain is getting worse and becoming more frequent / /Started 3 years ago )    Subjective        Mesha Sanchez presents to [facility]  History of Present Illness  31-year-old Icelandic-speaking female here for chronic disease management follow-up visit.      Pt states hand doc thought her hand sx came from her neck area.  Used Icelandic video  for interpretation during the entire visit.  All patient questions answered and all concerns clarified prior to conclusion of the visit.   tablet and service provided by [facility].      Pt reports she is planning to have Breast lift/augmentation surgery in [place], [place] and needs surgical clearance.  However pt reports hx of breast cysts.  Hand Pain   Pertinent negatives include no chest pain or numbness.     Symptoms are: new.   Onset was in the past 7 days.   Symptoms occur: intermittently.  Pertinent negative symptoms include no abdominal pain, no anorexia, no joint pain, no change in stool, no chest pain, no chills, no congestion, no cough, no diaphoresis, no fatigue, no fever, no headaches, no joint swelling, no myalgias, no nausea, no neck pain, no numbness, no rash, no sore throat, no swollen glands, no dysuria, no vertigo, no visual change, no vomiting and no weakness.   Treatment and/or Medications comments include: Tailenol       Objective   Vital Signs:  /72 (BP Location: Left arm, Patient Position: Sitting, Cuff Size: Adult)   Pulse 60   Temp 97.7 °F (36.5 °C) (Infrared)   Ht 170.2 cm (67.01\")   Wt 66.8 kg (147 lb 3.2 oz)   SpO2 100%   BMI 23.05 kg/m²   Estimated body mass index is 23.05 kg/m² as calculated from the following:    Height as of this encounter: 170.2 cm (67.01\").    Weight as of this encounter: 66.8 kg (147 lb 3.2 oz).       BMI is within normal parameters. No other follow-up for BMI required.      Physical Exam  Constitutional:       Appearance: " Normal appearance.   HENT:      Head: Normocephalic and atraumatic.   Eyes:      Conjunctiva/sclera: Conjunctivae normal.   Cardiovascular:      Rate and Rhythm: Normal rate and regular rhythm.      Heart sounds: Normal heart sounds.   Pulmonary:      Effort: Pulmonary effort is normal.      Breath sounds: Normal breath sounds.   Abdominal:      General: Bowel sounds are normal.      Palpations: Abdomen is soft.      Comments: Non-tender   Musculoskeletal:      Comments: Neck pain with lateral flexion of the neck to the right.  Negative right wrist ROM on exam.  No pain with right elbow ROM.  Right lateral elbow pain on palpation.  Negative right wrist tinel sign.  Right elbow discomfort with right wrist phalen test.    Right upper forearm pain with right elbow ROM.     Skin:     General: Skin is warm.   Neurological:      General: No focal deficit present.      Mental Status: She is alert and oriented to person, place, and time.   Psychiatric:         Mood and Affect: Mood normal.         Behavior: Behavior normal.        Result Review :    The following data was reviewed by: LAKSHMI Pollard on 06/09/2025:  CMP          1/21/2025    14:43   CMP   Glucose 89    BUN 9    Creatinine 0.68    EGFR 114    Sodium 140    Potassium 3.9    Chloride 103    Calcium 9.5    Total Protein 7.1    Albumin 4.8    Globulin 2.3    Total Bilirubin 0.5    Alkaline Phosphatase 76    AST (SGOT) 23    ALT (SGPT) 23    BUN/Creatinine Ratio 13      CBC          1/21/2025    14:43   CBC   WBC 9.3    RBC 4.67    Hemoglobin 14.8    Hematocrit 44.6    MCV 96    MCH 31.7    MCHC 33.2    RDW 11.5    Platelets 248      Lipid Panel          1/21/2025    14:43   Lipid Panel   Total Cholesterol 136    Triglycerides 50    HDL Cholesterol 55    VLDL Cholesterol 11    LDL Cholesterol  70        A1C Last 3 Results          1/21/2025    14:43   HGBA1C Last 3 Results   Hemoglobin A1C 5.6          Data reviewed : Consultant notes reviewed hand  surgery consult note from April 1, 2025 and patient was diagnosed with cervical radiculopathy per hand surgeon and defered further management by PCP.             Assessment and Plan     Diagnoses and all orders for this visit:    1. Chronic hand pain, right (Primary)  Assessment & Plan:  Patient status post evaluation by hand surgery and per hand surgery patient's etiology of her right upper extremity pain is coming from above the hand or wrist region on the right side.    Orders:  -     Cancel: Ambulatory Referral to Hand Surgery  -     XR Wrist 3+ View Right; Future    2. Neck pain  -     Ambulatory Referral to Physical Therapy for Evaluation & Treatment  -     baclofen (LIORESAL) 10 MG tablet; Take 1 tablet by mouth At Night As Needed for Muscle Spasms for up to 90 days.  Dispense: 30 tablet; Refill: 1  -     Diclofenac Sodium (VOLTAREN) 1 % gel gel; Apply 4 g topically to the appropriate area as directed 2 (Two) Times a Day As Needed (MSK pain) for up to 90 days.  Dispense: 100 g; Refill: 2  -     XR Spine Cervical Complete 4 or 5 View; Future    3. History of carpal tunnel surgery of right wrist  Assessment & Plan:  Patient status post hand surgery evaluation.    Orders:  -     Cancel: Ambulatory Referral to Hand Surgery    4. Right forearm pain  -     Ambulatory Referral to Physical Therapy for Evaluation & Treatment  -     Cancel: Ambulatory Referral to Hand Surgery  -     XR elbow 2 vw right; Future  -     XR forearm 2 vw right; Future    5. Right elbow pain  Assessment & Plan:  Lateral epicondylitis.    Orders:  -     Ambulatory Referral to Physical Therapy for Evaluation & Treatment  -     Ambulatory Referral to Orthopedic Surgery    6. Lateral epicondylitis of right elbow  Assessment & Plan:  Discussed physical therapy and orthopedic surgery referral.    Orders:  -     Ambulatory Referral to Physical Therapy for Evaluation & Treatment    7. Preoperative clearance  Assessment & Plan:  Informed the patient  we will wait on results of breast imaging studies prior to any clearance by primary care, for breast augmentation surgery.      8. Bilateral breast cysts  -     US breast bilateral complete; Future  -     Mammo Diagnostic Digital Tomosynthesis Bilateral With CAD; Future        All chronic conditions have been addressed and treated by the practice or other specialists. Medications have been reconciled and refilled as appropriate. Reiterated compliance and timely follow up appointments. Side effects of all new and old medications reviewed with the patient and patient willing to accept all risks involved. Advised RTO if no improvement or worsening of symptoms or if any new complaints arise. Patient advised to follow up with clinic or call after diagnostic tests, if patient does not hear from office 3 days after the test completion.            Follow Up     Return in about 6 weeks (around 7/21/2025) for Next scheduled follow up.  Patient was given instructions and counseling regarding her condition or for health maintenance advice. Please see specific information pulled into the AVS if appropriate.

## 2025-06-11 PROBLEM — N60.02 BILATERAL BREAST CYSTS: Status: ACTIVE | Noted: 2025-06-11

## 2025-06-11 PROBLEM — N60.01 BILATERAL BREAST CYSTS: Status: ACTIVE | Noted: 2025-06-11

## 2025-06-11 NOTE — ASSESSMENT & PLAN NOTE
Patient status post evaluation by hand surgery and per hand surgery patient's etiology of her right upper extremity pain is coming from above the hand or wrist region on the right side.

## 2025-06-11 NOTE — ASSESSMENT & PLAN NOTE
Informed the patient we will wait on results of breast imaging studies prior to any clearance by primary care, for breast augmentation surgery.

## 2025-06-16 ENCOUNTER — HOSPITAL ENCOUNTER (OUTPATIENT)
Dept: GENERAL RADIOLOGY | Facility: HOSPITAL | Age: 40
Discharge: HOME | End: 2025-06-16
Payer: COMMERCIAL

## 2025-06-16 ENCOUNTER — HOSPITAL ENCOUNTER (OUTPATIENT)
Dept: CARDIOLOGY | Facility: HOSPITAL | Age: 40
Discharge: HOME | End: 2025-06-16
Payer: COMMERCIAL

## 2025-06-16 ENCOUNTER — TRANSCRIBE ORDERS (OUTPATIENT)
Dept: LAB | Facility: HOSPITAL | Age: 40
End: 2025-06-16
Payer: COMMERCIAL

## 2025-06-16 ENCOUNTER — LAB (OUTPATIENT)
Dept: LAB | Facility: HOSPITAL | Age: 40
End: 2025-06-16
Payer: COMMERCIAL

## 2025-06-16 DIAGNOSIS — M79.631 RIGHT FOREARM PAIN: ICD-10-CM

## 2025-06-16 DIAGNOSIS — M54.2 NECK PAIN: ICD-10-CM

## 2025-06-16 DIAGNOSIS — Z01.811 PRE-OP CHEST EXAM: ICD-10-CM

## 2025-06-16 DIAGNOSIS — Z01.818 PRE-OP EVALUATION: ICD-10-CM

## 2025-06-16 DIAGNOSIS — G89.29 CHRONIC HAND PAIN, RIGHT: Chronic | ICD-10-CM

## 2025-06-16 DIAGNOSIS — Z01.818 PRE-OP EVALUATION: Primary | ICD-10-CM

## 2025-06-16 DIAGNOSIS — M79.641 CHRONIC HAND PAIN, RIGHT: Chronic | ICD-10-CM

## 2025-06-16 LAB
ALBUMIN SERPL-MCNC: 4.4 G/DL (ref 3.5–5.2)
ALBUMIN/GLOB SERPL: 1.5 G/DL
ALP SERPL-CCNC: 59 U/L (ref 39–117)
ALT SERPL W P-5'-P-CCNC: 28 U/L (ref 1–33)
ANION GAP SERPL CALCULATED.3IONS-SCNC: 10.2 MMOL/L (ref 5–15)
APTT PPP: 29.2 SECONDS (ref 22.7–35.4)
AST SERPL-CCNC: 23 U/L (ref 1–32)
BASOPHILS # BLD AUTO: 0.03 10*3/MM3 (ref 0–0.2)
BASOPHILS NFR BLD AUTO: 0.4 % (ref 0–1.5)
BILIRUB SERPL-MCNC: 0.5 MG/DL (ref 0–1.2)
BUN SERPL-MCNC: 8 MG/DL (ref 6–20)
BUN/CREAT SERPL: 11.9 (ref 7–25)
CALCIUM SPEC-SCNC: 9.2 MG/DL (ref 8.6–10.5)
CHLORIDE SERPL-SCNC: 106 MMOL/L (ref 98–107)
CO2 SERPL-SCNC: 24.8 MMOL/L (ref 22–29)
COAG MIXING STUDY INTERP: NORMAL
CREAT SERPL-MCNC: 0.67 MG/DL (ref 0.57–1)
DEPRECATED RDW RBC AUTO: 43.6 FL (ref 37–54)
EGFRCR SERPLBLD CKD-EPI 2021: 114.9 ML/MIN/1.73
EOSINOPHIL # BLD AUTO: 0.1 10*3/MM3 (ref 0–0.4)
EOSINOPHIL NFR BLD AUTO: 1.2 % (ref 0.3–6.2)
ERYTHROCYTE [DISTWIDTH] IN BLOOD BY AUTOMATED COUNT: 12 % (ref 12.3–15.4)
GLOBULIN UR ELPH-MCNC: 2.9 GM/DL
GLUCOSE SERPL-MCNC: 88 MG/DL (ref 65–99)
HBA1C MFR BLD: 5.3 % (ref 4.8–5.6)
HCG INTACT+B SERPL-ACNC: <1 MIU/ML
HCT VFR BLD AUTO: 45 % (ref 34–46.6)
HGB BLD-MCNC: 14.4 G/DL (ref 12–15.9)
HIV 1+2 AB+HIV1 P24 AG SERPL QL IA: NORMAL
IMM GRANULOCYTES # BLD AUTO: 0.02 10*3/MM3 (ref 0–0.05)
IMM GRANULOCYTES NFR BLD AUTO: 0.2 % (ref 0–0.5)
LYMPHOCYTES # BLD AUTO: 2.8 10*3/MM3 (ref 0.7–3.1)
LYMPHOCYTES NFR BLD AUTO: 33.8 % (ref 19.6–45.3)
MCH RBC QN AUTO: 31.3 PG (ref 26.6–33)
MCHC RBC AUTO-ENTMCNC: 32 G/DL (ref 31.5–35.7)
MCV RBC AUTO: 97.8 FL (ref 79–97)
MONOCYTES # BLD AUTO: 0.6 10*3/MM3 (ref 0.1–0.9)
MONOCYTES NFR BLD AUTO: 7.2 % (ref 5–12)
NEUTROPHILS NFR BLD AUTO: 4.74 10*3/MM3 (ref 1.7–7)
NEUTROPHILS NFR BLD AUTO: 57.2 % (ref 42.7–76)
NRBC BLD AUTO-RTO: 0 /100 WBC (ref 0–0.2)
PLATELET # BLD AUTO: 248 10*3/MM3 (ref 140–450)
PMV BLD AUTO: 10.6 FL (ref 6–12)
POTASSIUM SERPL-SCNC: 3.7 MMOL/L (ref 3.5–5.2)
PROT SERPL-MCNC: 7.3 G/DL (ref 6–8.5)
PROTHROMBIN TIME: 13.3 SECONDS (ref 11.7–14.2)
QT INTERVAL: 433 MS
QTC INTERVAL: 414 MS
RBC # BLD AUTO: 4.6 10*6/MM3 (ref 3.77–5.28)
SODIUM SERPL-SCNC: 141 MMOL/L (ref 136–145)
WBC NRBC COR # BLD AUTO: 8.29 10*3/MM3 (ref 3.4–10.8)

## 2025-06-16 PROCEDURE — 72050 X-RAY EXAM NECK SPINE 4/5VWS: CPT

## 2025-06-16 PROCEDURE — 73090 X-RAY EXAM OF FOREARM: CPT

## 2025-06-16 PROCEDURE — 73110 X-RAY EXAM OF WRIST: CPT

## 2025-06-16 PROCEDURE — 80305 DRUG TEST PRSMV DIR OPT OBS: CPT

## 2025-06-16 PROCEDURE — G0432 EIA HIV-1/HIV-2 SCREEN: HCPCS

## 2025-06-16 PROCEDURE — 83036 HEMOGLOBIN GLYCOSYLATED A1C: CPT

## 2025-06-16 PROCEDURE — 85611 PROTHROMBIN TEST: CPT

## 2025-06-16 PROCEDURE — 85610 PROTHROMBIN TIME: CPT

## 2025-06-16 PROCEDURE — 80053 COMPREHEN METABOLIC PANEL: CPT

## 2025-06-16 PROCEDURE — 85025 COMPLETE CBC W/AUTO DIFF WBC: CPT

## 2025-06-16 PROCEDURE — 93005 ELECTROCARDIOGRAM TRACING: CPT | Performed by: SURGERY

## 2025-06-16 PROCEDURE — 85730 THROMBOPLASTIN TIME PARTIAL: CPT

## 2025-06-16 PROCEDURE — 84702 CHORIONIC GONADOTROPIN TEST: CPT

## 2025-06-16 PROCEDURE — 36415 COLL VENOUS BLD VENIPUNCTURE: CPT

## 2025-06-16 PROCEDURE — 73070 X-RAY EXAM OF ELBOW: CPT

## 2025-06-17 ENCOUNTER — TELEPHONE (OUTPATIENT)
Dept: SPORTS MEDICINE | Facility: CLINIC | Age: 40
End: 2025-06-17

## 2025-06-17 LAB
COTININE UR QL SCN: POSITIVE NG/ML
SERVICE CMNT-IMP: ABNORMAL

## 2025-06-17 NOTE — TELEPHONE ENCOUNTER
Caller: MEIR MUSE  Relationship to patient: SELF  Best call back number:  [phone]    Service:Cameroonian  Is  needs updated in registration: YES  Date of Appointment: 6-23-25  Time of Appointment: 1:20

## 2025-06-18 ENCOUNTER — OFFICE VISIT (OUTPATIENT)
Dept: OBSTETRICS AND GYNECOLOGY | Facility: CLINIC | Age: 40
End: 2025-06-18
Payer: COMMERCIAL

## 2025-06-18 VITALS
HEIGHT: 67 IN | SYSTOLIC BLOOD PRESSURE: 99 MMHG | DIASTOLIC BLOOD PRESSURE: 61 MMHG | WEIGHT: 143 LBS | BODY MASS INDEX: 22.44 KG/M2 | HEART RATE: 66 BPM

## 2025-06-18 DIAGNOSIS — R10.2 PELVIC PAIN: ICD-10-CM

## 2025-06-18 DIAGNOSIS — N94.10 DYSPAREUNIA IN FEMALE: ICD-10-CM

## 2025-06-18 DIAGNOSIS — Z01.818 PRE-OP TESTING: Primary | ICD-10-CM

## 2025-06-18 PROCEDURE — 99213 OFFICE O/P EST LOW 20 MIN: CPT | Performed by: OBSTETRICS & GYNECOLOGY

## 2025-06-18 RX ORDER — DOXYCYCLINE 100 MG/1
100 CAPSULE ORAL 2 TIMES DAILY
Qty: 20 CAPSULE | Refills: 0 | Status: SHIPPED | OUTPATIENT
Start: 2025-06-18 | End: 2025-06-23

## 2025-06-18 NOTE — PROGRESS NOTES
"Subjective    is a 39 y.o. female c/o painful intercourse. Pain is after and feels inflammation in her abdomen after intercourse.  She also c/o urinary incontinence with strenuous activity. She request labs and breast US for upcoming surgery.     Chief Complaint   Patient presents with    Dyspareunia        HPI    39 y.o.    Presents for several issues   - Pain following intercourse - 6-7 weeks   Some urinary incontinence, no hematuria or dysuria   No vaginal discharge       Review of Systems   Constitutional:  Negative for fever.   Gastrointestinal:  Negative for abdominal pain, constipation and diarrhea.   Genitourinary:  Positive for dyspareunia and urinary incontinence. Negative for dysuria, frequency, hematuria, vaginal bleeding and vaginal discharge.        Objective   BP 99/61   Pulse 66   Ht 170.2 cm (67\")   Wt 64.9 kg (143 lb)   BMI 22.40 kg/m²   Physical Exam  Constitutional:       General: She is not in acute distress.     Appearance: Normal appearance. She is well-developed and normal weight.   Genitourinary:      Right Labia: No lesions or Bartholin's cyst.     Left Labia: No lesions or Bartholin's cyst.     No inguinal adenopathy present in the right or left side.     No vaginal discharge or bleeding.        Right Adnexa: not tender, not full and no mass present.     Left Adnexa: not tender, not full and no mass present.     No cervical motion tenderness or friability.      No IUD strings visualized.      Uterus is tender.      Uterus is not enlarged.      No uterine mass detected.     Uterus is anteverted.   Abdominal:      General: Abdomen is flat. There is no distension.      Palpations: Abdomen is soft.      Tenderness: There is no abdominal tenderness.   Lymphadenopathy:      Lower Body: No right inguinal adenopathy. No left inguinal adenopathy.   Neurological:      Mental Status: She is alert.   Vitals reviewed. Exam conducted with a chaperone present.          Assessment/Plan "   Diagnoses and all orders for this visit:    1. Pre-op testing (Primary)    2. Dyspareunia in female  -     Chlamydia trachomatis, Neisseria gonorrhoeae, Trichomonas vaginalis, PCR - Swab, Cervix  -     Urine Culture - , Urine, Clean Catch  -     US Non-ob Transvaginal    3. Pelvic pain  -     Chlamydia trachomatis, Neisseria gonorrhoeae, Trichomonas vaginalis, PCR - Swab, Cervix  -     Urine Culture - , Urine, Clean Catch  -     US Non-ob Transvaginal    Other orders  -     doxycycline (MONODOX) 100 MG capsule; Take 1 capsule by mouth 2 (Two) Times a Day for 10 days.  Dispense: 20 capsule; Refill: 0    1) Pre op for plastic surgery   Not the type of medicine we do.   Has orders, results for all the testing requested from primary care done 2 days ago.   She needs them to fill out her clearance, this is not within the scope of this practice.     2) Dyspareunia, pelvic pain and tenderness  Suspect some uterine inflammation based on exam   Checking ultrasound, Checking urine culture and STD screen   - empiric treatment with doxycyline   - if not resolved return to consider further   - if resolves but urinary incontinence persists would need to consider urogyn for stress incontinence, suspect mild UTI first     Russ Obrien MD   6/18/2025  13:14 EDT

## 2025-06-20 ENCOUNTER — RESULTS FOLLOW-UP (OUTPATIENT)
Dept: OBSTETRICS AND GYNECOLOGY | Facility: CLINIC | Age: 40
End: 2025-06-20
Payer: COMMERCIAL

## 2025-06-23 ENCOUNTER — OFFICE VISIT (OUTPATIENT)
Dept: SPORTS MEDICINE | Facility: CLINIC | Age: 40
End: 2025-06-23
Payer: COMMERCIAL

## 2025-06-23 ENCOUNTER — TELEPHONE (OUTPATIENT)
Dept: OBSTETRICS AND GYNECOLOGY | Facility: CLINIC | Age: 40
End: 2025-06-23

## 2025-06-23 VITALS — WEIGHT: 143 LBS | BODY MASS INDEX: 22.44 KG/M2 | RESPIRATION RATE: 18 BRPM | HEIGHT: 67 IN

## 2025-06-23 DIAGNOSIS — M79.609 PARESTHESIA AND PAIN OF RIGHT EXTREMITY: Primary | ICD-10-CM

## 2025-06-23 DIAGNOSIS — R20.2 PARESTHESIA AND PAIN OF RIGHT EXTREMITY: Primary | ICD-10-CM

## 2025-06-23 DIAGNOSIS — M50.30 DDD (DEGENERATIVE DISC DISEASE), CERVICAL: ICD-10-CM

## 2025-06-23 DIAGNOSIS — G56.21 CUBITAL TUNNEL SYNDROME ON RIGHT: ICD-10-CM

## 2025-06-23 DIAGNOSIS — M62.81 MUSCLE WEAKNESS OF RIGHT UPPER EXTREMITY: ICD-10-CM

## 2025-06-23 PROCEDURE — 99214 OFFICE O/P EST MOD 30 MIN: CPT | Performed by: STUDENT IN AN ORGANIZED HEALTH CARE EDUCATION/TRAINING PROGRAM

## 2025-06-23 RX ORDER — PREDNISONE 20 MG/1
TABLET ORAL
Qty: 14 TABLET | Refills: 0 | Status: SHIPPED | OUTPATIENT
Start: 2025-06-23 | End: 2025-07-05

## 2025-06-23 NOTE — PROGRESS NOTES
Mesha is a 39 y.o. year old female here today for consultation requested by José Miguel Shin*     Chief Complaint   Patient presents with    Right Elbow - Initial Evaluation, Pain     Radiating pain from right elbow down to the right hand and up to the right shoulder. Has been dealing with pain for 3 years destinee has gotten worse in the last 6-7 months.        History of Present Illness  *Video  used throughout the encounter.     Mesha is a 39 y.o. year old RHD female here today for right arm pain that has been present for the past few years but progressively worsening for the past 7 months with no known injury or trauma.   History of Present Illness  The patient presents for evaluation of right arm pain. She is accompanied by an .    She reports a progressive increase in the intensity of her hand pain, which has escalated to a sharp discomfort extending to her elbow. The most severe pain is localized in the elbow, with radiating discomfort from her hand to her shoulder, particularly at night. She experiences numbness and tingling sensations throughout her hand, along with difficulty in opening her hand due to pain. There is also a noted decrease in muscle strength and the presence of tremors in her hand. Certain positions exacerbate her pain, such as those involving computer use. She has been managing the pain with analgesics and anti-inflammatories, and was recently prescribed naproxen for muscle relief. She has also used diclofenac gel. She is scheduled for cosmetic surgery on 07/15/2025 and has been advised to avoid all medications until the day of surgery.     She has a history of carpal tunnel syndrome diagnosed 5 years ago, which was treated with steroid injections, therapy, and ultimately surgery. The current pain differs from the pre-surgical pain, which was confined to the hand.    She is right-handed but has adapted to using her left hand due to the pain. 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     SOCIAL HISTORY  Occupations: Works at [employer]      The following data was reviewed by: Justyna Abbasi DO on 06/23/2025:  Data reviewed : Radiologic studies as detailed below and PCP note from 6/9/25     Results for orders placed during the hospital encounter of 06/16/25    XR Elbow 2 View Right  XR Forearm 2 View Right  XR Wrist 3+ View Right  XR Spine Cervical Complete    Narrative  XR WRIST 3+ VW RIGHT-, XR ELBOW 2 VW RIGHT-, XR SPINE CERVICAL COMPLETE  4 OR 5 VW-, XR FOREARM 2 VW RIGHT-    CLINICAL INFORMATION: Pain.    RIGHT UPPER EXTREMITY FINDINGS: No elbow effusion, fracture or  dislocation is demonstrated. There is amorphous soft tissue  calcification demonstrated along the lateral humeral epicondyle. Suspect  epicondylitis. The radius and ulna as well as the wrist have a  satisfactory appearance.    CONCLUSION: Soft tissue calcification adjacent to the lateral humeral  epicondyle, consistent with epicondylitis.    CERVICAL SPINE FINDINGS: The odontoid is preserved with a satisfactory  C1-2 alignment and the prevertebral soft tissues are normal. Disc space  narrowing C6-7. No acute osseous abnormality nor subluxation and the  posterior elements have a satisfactory appearance.    CONCLUSION: C6-7 disc space narrowing, otherwise within normal limits.    This report was finalized on 6/17/2025 8:33 AM by Dr. Ata Holder M.D  on Workstation: [id number]    Lab Results   Component Value Date    GLUCOSE 88 06/16/2025    BUN 8.0 06/16/2025    CREATININE 0.67 06/16/2025     06/16/2025    K 3.7 06/16/2025     06/16/2025    CALCIUM 9.2 06/16/2025    PROTEINTOT 7.3 06/16/2025    ALBUMIN 4.4 06/16/2025    ALT 28 06/16/2025    AST 23 06/16/2025    ALKPHOS 59 06/16/2025    BILITOT 0.5 06/16/2025    GLOB 2.9 06/16/2025    AGRATIO 1.5 06/16/2025    BCR 11.9 06/16/2025    ANIONGAP 10.2 06/16/2025    EGFR 114.9  "06/16/2025        Resp 18   Ht 170.2 cm (67\")   Wt 64.9 kg (143 lb)   BMI 22.40 kg/m²        Physical Exam  Vital signs reviewed.   General: Well developed, well nourished; No acute distress.  Eyes: conjunctiva clear; pupils equally round and reactive  ENT: external ears and nose atraumatic; hearing normal  CV: no peripheral edema, 2+ distal pulses  Resp: normal respiratory effort, no use of accessory muscles  Skin: normal color and pigmentation; no rashes or wounds; normal turgor  Psych: alert and oriented; mood and affect appropriate; recent and remote memory intact  Neuro: sensation to light touch intact    MSK Exam:  The right arm is without obvious signs of acute bony deformity, atrophy, swelling, erythema or ecchymosis. There is non-focal or consistently reproducible tenderness at the dorsal wrist. No bony tenderness of the distal radius or distal ulna. No tenderness of the anatomic snuffbox or first dorsal compartment. There is also vague tenderness of the forearm. No tenderness of the medial or lateral epicondyle. Significant tenderness with palpation of the cubital tunnel and just distal with in the posteromedial forearm. Active and passive range of motion at the wrist are full, symmetrical, but painful. Strength testing of the hand and wrist is significantly weak with 3/5 compared to the left. Sensation intact and equal. Sensory and vascular exams are otherwise normal.     The cervical spine without obvious signs of deformity, scoliosis, erythema, or ecchymosis. Cervical range of motion is full in all directions but with pain at end range of left rotation and left side bending with symptoms reported down the right arm. Spurling's test is positive. Tinel's at the Cubital Tunnel is positive.       Assessment and Plan  Diagnoses and all orders for this visit:    1. Paresthesia and pain of right extremity (Primary)  -     predniSONE (DELTASONE) 20 MG tablet; Take 2 tablets by mouth Daily for 4 days, THEN 1 " tablet Daily for 4 days, THEN 0.5 tablets Daily for 4 days.  Dispense: 14 tablet; Refill: 0  -     Ambulatory Referral to Physical Therapy for Evaluation & Treatment    2. DDD (degenerative disc disease), cervical  -     predniSONE (DELTASONE) 20 MG tablet; Take 2 tablets by mouth Daily for 4 days, THEN 1 tablet Daily for 4 days, THEN 0.5 tablets Daily for 4 days.  Dispense: 14 tablet; Refill: 0  -     Ambulatory Referral to Physical Therapy for Evaluation & Treatment    3. Muscle weakness of right upper extremity  -     predniSONE (DELTASONE) 20 MG tablet; Take 2 tablets by mouth Daily for 4 days, THEN 1 tablet Daily for 4 days, THEN 0.5 tablets Daily for 4 days.  Dispense: 14 tablet; Refill: 0  -     Ambulatory Referral to Physical Therapy for Evaluation & Treatment    4. Cubital tunnel syndrome on right  -     predniSONE (DELTASONE) 20 MG tablet; Take 2 tablets by mouth Daily for 4 days, THEN 1 tablet Daily for 4 days, THEN 0.5 tablets Daily for 4 days.  Dispense: 14 tablet; Refill: 0  -     Ambulatory Referral to Physical Therapy for Evaluation & Treatment    Mesha is a 39 y.o. year old RHD female here today for right arm pain that has been present for the past few years but progressively worsening for the past 7 months with no known injury or trauma. We reviewed images and exam findings. Initial x-rays of the cervical spine show some straightening of the normal cervical lordosis and mild lower disc space narrowing. No significant findings on imaging of the elbow, forearm, or wrist.    Assessment & Plan  X-rays of the elbow showed a calcification near the lateral epicondyle, though she is not tender at this location. The symptoms do not align with epicondylitis, which typically presents with more focal tenderness without radicular symptoms of numbness and/or pain proximally. The primary concern is a potential nerve issue in the neck that could be causing radiating pain down the arm. A referral for physical  therapy will be made to address the neck muscles and potentially alleviate the symptoms. An Ace wrap will be provided to prevent bending of the arm during sleep to treat any component of cubital tunnel syndrome. A prescription for a tapering course of oral steroids will be sent to pharmacy. The patient is advised to consult with her surgeon regarding the use of steroids prior to surgery. A note will be provided for her employer, recommending light duty work and avoiding lifting more than 10 pounds with the right arm. If the symptoms persist, additional evaluation with EMG and/or MRI may be considered.    Follow-up: The patient will follow up in 6 weeks.  All of her questions were answered and she is agreeable with the plan.    Dictated utilizing Dragon dictation.  Patient or patient representative verbalized consent for the use of Ambient Listening during the visit with  Justyna Abbasi DO for chart documentation. 6/23/2025  13:47 EDT

## 2025-06-23 NOTE — LETTER
June 23, 2025     Patient: Mesha Sanchez   YOB: 1985   Date of Visit: 6/23/2025       To Whom It May Concern:    It is my medical opinion that Mesha Sanchez may return to light duty immediately with the following restrictions: Please limit lifting/pushing/pulling greater than 10 pounds with the right arm. 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 We will follow-up in roughly 6 weeks and will make updated recommendations accordingly.            Sincerely,        Justyna Abbasi DO    CC: No Recipients

## 2025-06-23 NOTE — TELEPHONE ENCOUNTER
Georgiana,     Seen for pain after sex  Cultures and ultrasound are unremarkable.   If not better after antibiotic, can return to discuss next steps - further options of GnRH or surgery     Russ Obrien MD  6/23/2025  16:54 EDT

## 2025-06-23 NOTE — LETTER
June 24, 2025     LAKSHMI Pollard  [address]  [address]  [place] [address]    Patient: Mesha Sanchez   YOB: 1985   Date of Visit: 6/23/2025       Dear LAKSHMI Pollard,    Thank you for referring Mesha Sanchez to me for evaluation. Below is a copy of my consult note.    If you have questions, please do not hesitate to call me. I look forward to following Mesha along with you.         Sincerely,        Justyna Abbasi, DO        CC: No Recipients    Mesha is a 39 y.o. year old female here today for consultation requested by José Miguel Shin*     Chief Complaint   Patient presents with   • Right Elbow - Initial Evaluation, Pain     Radiating pain from right elbow down to the right hand and up to the right shoulder. Has been dealing with pain for 3 years destinee has gotten worse in the last 6-7 months.        History of Present Illness  *Video  used throughout the encounter.     Mesha is a 39 y.o. year old RHD female here today for right arm pain that has been present for the past few years but progressively worsening for the past 7 months with no known injury or trauma.   History of Present Illness  The patient presents for evaluation of right arm pain. She is accompanied by an .    She reports a progressive increase in the intensity of her hand pain, which has escalated to a sharp discomfort extending to her elbow. The most severe pain is localized in the elbow, with radiating discomfort from her hand to her shoulder, particularly at night. She experiences numbness and tingling sensations throughout her hand, along with difficulty in opening her hand due to pain. There is also a noted decrease in muscle strength and the presence of tremors in her hand. Certain positions exacerbate her pain, such as those involving computer use. She has been managing the pain with analgesics and anti-inflammatories, and was recently  prescribed naproxen for muscle relief. She has also used diclofenac gel. She is scheduled for cosmetic surgery on 07/15/2025 and has been advised to avoid all medications until the day of surgery.     She has a history of carpal tunnel syndrome diagnosed 5 years ago, which was treated with steroid injections, therapy, and ultimately surgery. The current pain differs from the pre-surgical pain, which was confined to the hand.    She is right-handed but has adapted to using her left hand due to the pain. 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     SOCIAL HISTORY  Occupations: Works at [employer]      The following data was reviewed by: Justyna Abbasi DO on 06/23/2025:  Data reviewed : Radiologic studies as detailed below and PCP note from 6/9/25     Results for orders placed during the hospital encounter of 06/16/25    XR Elbow 2 View Right  XR Forearm 2 View Right  XR Wrist 3+ View Right  XR Spine Cervical Complete    Narrative  XR WRIST 3+ VW RIGHT-, XR ELBOW 2 VW RIGHT-, XR SPINE CERVICAL COMPLETE  4 OR 5 VW-, XR FOREARM 2 VW RIGHT-    CLINICAL INFORMATION: Pain.    RIGHT UPPER EXTREMITY FINDINGS: No elbow effusion, fracture or  dislocation is demonstrated. There is amorphous soft tissue  calcification demonstrated along the lateral humeral epicondyle. Suspect  epicondylitis. The radius and ulna as well as the wrist have a  satisfactory appearance.    CONCLUSION: Soft tissue calcification adjacent to the lateral humeral  epicondyle, consistent with epicondylitis.    CERVICAL SPINE FINDINGS: The odontoid is preserved with a satisfactory  C1-2 alignment and the prevertebral soft tissues are normal. Disc space  narrowing C6-7. No acute osseous abnormality nor subluxation and the  posterior elements have a satisfactory appearance.    CONCLUSION: C6-7 disc space narrowing, otherwise within normal limits.    This report was finalized on 6/17/2025 8:33 AM  "by Dr. Ata Holder M.D  on Workstation: [id number]    Lab Results   Component Value Date    GLUCOSE 88 06/16/2025    BUN 8.0 06/16/2025    CREATININE 0.67 06/16/2025     06/16/2025    K 3.7 06/16/2025     06/16/2025    CALCIUM 9.2 06/16/2025    PROTEINTOT 7.3 06/16/2025    ALBUMIN 4.4 06/16/2025    ALT 28 06/16/2025    AST 23 06/16/2025    ALKPHOS 59 06/16/2025    BILITOT 0.5 06/16/2025    GLOB 2.9 06/16/2025    AGRATIO 1.5 06/16/2025    BCR 11.9 06/16/2025    ANIONGAP 10.2 06/16/2025    EGFR 114.9 06/16/2025        Resp 18   Ht 170.2 cm (67\")   Wt 64.9 kg (143 lb)   BMI 22.40 kg/m²        Physical Exam  Vital signs reviewed.   General: Well developed, well nourished; No acute distress.  Eyes: conjunctiva clear; pupils equally round and reactive  ENT: external ears and nose atraumatic; hearing normal  CV: no peripheral edema, 2+ distal pulses  Resp: normal respiratory effort, no use of accessory muscles  Skin: normal color and pigmentation; no rashes or wounds; normal turgor  Psych: alert and oriented; mood and affect appropriate; recent and remote memory intact  Neuro: sensation to light touch intact    MSK Exam:  The right arm is without obvious signs of acute bony deformity, atrophy, swelling, erythema or ecchymosis. There is non-focal or consistently reproducible tenderness at the dorsal wrist. No bony tenderness of the distal radius or distal ulna. No tenderness of the anatomic snuffbox or first dorsal compartment. There is also vague tenderness of the forearm. No tenderness of the medial or lateral epicondyle. Significant tenderness with palpation of the cubital tunnel and just distal with in the posteromedial forearm. Active and passive range of motion at the wrist are full, symmetrical, but painful. Strength testing of the hand and wrist is significantly weak with 3/5 compared to the left. Sensation intact and equal. Sensory and vascular exams are otherwise normal.     The cervical spine " without obvious signs of deformity, scoliosis, erythema, or ecchymosis. Cervical range of motion is full in all directions but with pain at end range of left rotation and left side bending with symptoms reported down the right arm. Spurling's test is positive. Tinel's at the Cubital Tunnel is positive.       Assessment and Plan  Diagnoses and all orders for this visit:    1. Paresthesia and pain of right extremity (Primary)  -     predniSONE (DELTASONE) 20 MG tablet; Take 2 tablets by mouth Daily for 4 days, THEN 1 tablet Daily for 4 days, THEN 0.5 tablets Daily for 4 days.  Dispense: 14 tablet; Refill: 0  -     Ambulatory Referral to Physical Therapy for Evaluation & Treatment    2. DDD (degenerative disc disease), cervical  -     predniSONE (DELTASONE) 20 MG tablet; Take 2 tablets by mouth Daily for 4 days, THEN 1 tablet Daily for 4 days, THEN 0.5 tablets Daily for 4 days.  Dispense: 14 tablet; Refill: 0  -     Ambulatory Referral to Physical Therapy for Evaluation & Treatment    3. Muscle weakness of right upper extremity  -     predniSONE (DELTASONE) 20 MG tablet; Take 2 tablets by mouth Daily for 4 days, THEN 1 tablet Daily for 4 days, THEN 0.5 tablets Daily for 4 days.  Dispense: 14 tablet; Refill: 0  -     Ambulatory Referral to Physical Therapy for Evaluation & Treatment    4. Cubital tunnel syndrome on right  -     predniSONE (DELTASONE) 20 MG tablet; Take 2 tablets by mouth Daily for 4 days, THEN 1 tablet Daily for 4 days, THEN 0.5 tablets Daily for 4 days.  Dispense: 14 tablet; Refill: 0  -     Ambulatory Referral to Physical Therapy for Evaluation & Treatment    Mesha is a 39 y.o. year old RHD female here today for right arm pain that has been present for the past few years but progressively worsening for the past 7 months with no known injury or trauma. We reviewed images and exam findings. Initial x-rays of the cervical spine show some straightening of the normal cervical lordosis and mild lower disc  space narrowing. No significant findings on imaging of the elbow, forearm, or wrist.    Assessment & Plan  X-rays of the elbow showed a calcification near the lateral epicondyle, though she is not tender at this location. The symptoms do not align with epicondylitis, which typically presents with more focal tenderness without radicular symptoms of numbness and/or pain proximally. The primary concern is a potential nerve issue in the neck that could be causing radiating pain down the arm. A referral for physical therapy will be made to address the neck muscles and potentially alleviate the symptoms. An Ace wrap will be provided to prevent bending of the arm during sleep to treat any component of cubital tunnel syndrome. A prescription for a tapering course of oral steroids will be sent to pharmacy. The patient is advised to consult with her surgeon regarding the use of steroids prior to surgery. A note will be provided for her employer, recommending light duty work and avoiding lifting more than 10 pounds with the right arm. If the symptoms persist, additional evaluation with EMG and/or MRI may be considered.    Follow-up: The patient will follow up in 6 weeks.  All of her questions were answered and she is agreeable with the plan.    Dictated utilizing Dragon dictation.  Patient or patient representative verbalized consent for the use of Ambient Listening during the visit with  Justyna Abbasi DO for chart documentation. 6/23/2025  13:47 EDT

## 2025-06-27 ENCOUNTER — PATIENT ROUNDING (BHMG ONLY) (OUTPATIENT)
Dept: SPORTS MEDICINE | Facility: CLINIC | Age: 40
End: 2025-06-27
Payer: COMMERCIAL

## 2025-06-27 NOTE — PROGRESS NOTES
June 27, 2025      A [employer] Message has been sent to the patient for PATIENT ROUNDING with [facility]

## 2025-07-01 ENCOUNTER — TREATMENT (OUTPATIENT)
Dept: PHYSICAL THERAPY | Facility: CLINIC | Age: 40
End: 2025-07-01
Payer: COMMERCIAL

## 2025-07-01 DIAGNOSIS — R29.898 DECREASED GRIP STRENGTH OF RIGHT HAND: ICD-10-CM

## 2025-07-01 DIAGNOSIS — M53.82 NECK MUSCLE WEAKNESS: ICD-10-CM

## 2025-07-01 DIAGNOSIS — G56.21 CUBITAL TUNNEL SYNDROME ON RIGHT: ICD-10-CM

## 2025-07-01 DIAGNOSIS — M25.521 PAIN IN RIGHT ELBOW: ICD-10-CM

## 2025-07-01 DIAGNOSIS — M62.81 MUSCLE WEAKNESS OF RIGHT UPPER EXTREMITY: ICD-10-CM

## 2025-07-01 DIAGNOSIS — M54.12 CERVICAL RADICULOPATHY: ICD-10-CM

## 2025-07-01 DIAGNOSIS — M25.511 RIGHT SHOULDER PAIN, UNSPECIFIED CHRONICITY: ICD-10-CM

## 2025-07-01 DIAGNOSIS — M50.30 DDD (DEGENERATIVE DISC DISEASE), CERVICAL: Primary | ICD-10-CM

## 2025-07-01 NOTE — PROGRESS NOTES
"  Physical Therapy Initial Evaluation and Plan of Care  [address] [facility].  [place], [place]  [phone]    Patient: Mesha Sanchez   : 1985  Diagnosis/ICD-10 Code:  DDD (degenerative disc disease), cervical [M50.30]  Referring practitioner: José Miguel Shin*  Date of Initial Visit: 2025  Today's Date: 2025  Patient seen for 1 sessions           Subjective Questionnaire: NDI:8       Subjective Evaluation    History of Present Illness  Mechanism of injury: Pt required the use of an  during this evaluation.    Pt reports neck, right elbow, and right hand pain beginning 6-7 months ago. She mentions a history of carpal tunnel syndrome in her right wrist. She says that it feels like a cramping and numbness in her right hand. Her pain is most noticeable at night and occasionally has to take medication. The pain feels worse with movements of the neck. She says that this pain bothers her while she is at work and has difficulty performing work duties, depending on her position. She mentions that she has limited movement and strength of her hand. She feels some relief with wrist flexion and extension stretching. At the time of visit she reports pain only in her right elbow. She says that she cannot write with her right hand without significant difficulty. She would like to be able to do this again and open her hands without pain.    Pt received an x-ray on 25. 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, checking packages, lots of hand work Quality of life: good    Pain  Current pain ratin  At best pain ratin  At worst pain rating: 10  Location: Right  Quality: cramping, squeezing and pressure  Relieving factors: medications and change in position  Aggravating factors: " outstretched reach and movement  Progression: no change    Hand dominance: right    Diagnostic Tests  X-ray: abnormal    Patient Goals  Patient goals for therapy: increased strength, decreased pain, independence with ADLs/IADLs, increased motion and return to sport/leisure activities           Objective          Palpation   Left   No palpable tenderness to the biceps, brachioradialis, triceps, wrist extensors and wrist flexors.     Right   No palpable tenderness to the biceps, brachioradialis, triceps, wrist extensors and wrist flexors.     Tenderness     Left Elbow   No tenderness in the cubital tunnel, lateral epicondyle and medial epicondyle.     Right Elbow   No tenderness in the cubital tunnel, lateral epicondyle and medial epicondyle.     Left Wrist/Hand   No tenderness in the lateral epicondyle and medial epicondyle.     Right Wrist/Hand   No tenderness in the lateral epicondyle and medial epicondyle.     Neurological Testing     Sensation   Cervical/Thoracic   Left   Intact: light touch    Right   Diminished: light touch    Comments   Right light touch: T1.     Active Range of Motion   Left Shoulder   Flexion: WFL  Extension: WFL  External rotation BTH: WFL  Internal rotation BTB: WFL    Right Shoulder   Flexion: WFL  Extension: WFL  External rotation BTH: T1 with pain  Internal rotation BTB: T10 with pain    Left Wrist   Wrist flexion: with pain  Wrist extension: with pain    Right Wrist   Wrist flexion: with pain  Wrist extension: with pain    Additional Active Range of Motion Details  SFMA  Cervical Flexion - Dysfunctional Nonpainful  - Able to touch chin to chest  - Non-uniform spinal curve    Cervical Extension - Dysfunctional Painful  - Nose and chin >10 degrees from parallel  - Reproduction of pain in right neck    Cervical Rotation - Bilateral Dysfunctional Painful  - Nose not in line with clavicle  - Reproduction of pain in right neck      SFMA    UE1 - L: Dysfunctional Non-painful; R: Dysfunctional  Painful  - Bilateral scapular winging  - R: unable to touch opposite inferior angle  - Reproduction of pain R elbow    UE2 - L: Dysfunctional Non-painful; R: Dysfunctional Painful  - Bilateral scapular winging  - R: unable to touch opposite spine of scapula  - Reproduction of pain R elbow  Painful finger extension    Passive Range of Motion   Left Shoulder   Normal passive range of motion    Right Shoulder   Normal passive range of motion    Left Elbow   Normal passive range of motion    Right Elbow   Normal passive range of motion  Flexion: with pain    Left Wrist   Wrist flexion: WFL  Wrist extension: WFL  Radial deviation: WFL  Ulnar deviation: WFL    Right Wrist   Wrist flexion: WFL  Wrist extension: WFL  Radial deviation: WFL  Ulnar deviation: WFL     Additional Passive Range of Motion Details  SFMA Cervical Breakout  Supine Flexion  - Able to bring chin to chest  - Pain with flexion then rotation (upper cervical)    Supine Rotation  - Nose in line with clavicle    Supine Extension  - Pain in right neck  SFMA UE Breakout    UE1  - Extension - WNL  - IR - WNL  - Elbow flexion - Pain in R elbow    UE2  - Flexion - WNL  - ER - WNL  - Elbow flexion - Pain in R elbow     Strength/Myotome Testing   Cervical Spine   Neck flexion: 4    Left   Neck lateral flexion (C3): 4+ (Pain in right neck)  Levator scapulae (C4): 5    Right   Neck lateral flexion (C3): 4 (Pain in right neck)  Levator scapulae (C4): 5 (Pain in lateral shoulder)    Left Shoulder     Planes of Motion   Abduction: 4+     Isolated Muscles   Levator scapulae: 5     Right Shoulder     Planes of Motion   Abduction: 4 (Pain in lateral shoulder)     Isolated Muscles   Levator scapulae: 5 (Pain in lateral shoulder)     Left Elbow   Flexion: 5  Extension: 5    Right Elbow   Flexion: 4 (Pain)  Extension: 4 (Pain)    Left Wrist/Hand      (2nd hand position)     Trial 1: 45 lbs    Trial 2: 40 lbs    Trial 3: 40 lbs    Average: 41.67 lbs    Right Wrist/Hand       (2nd hand position)     Trial 1: 20 lbs    Trial 2: 25 lbs    Trial 3: 20 lbs    Average: 21.67 lbs    Tests   Cervical     Left   Positive Spurling's sign.   Negative cervical distraction.     Right   Positive Spurling's sign.   Negative cervical distraction and ULTT1.     Left Elbow   Negative Tinel's sign (cubital tunnel).     Right Elbow   Negative Tinel's sign (cubital tunnel).     Left Wrist/Hand   Negative AIN OK sign and Froment's sign.     Right Wrist/Hand   Positive AIN OK sign and Froment's sign.           Assessment & Plan       Assessment  Impairments: abnormal coordination, abnormal muscle firing, abnormal muscle tone, abnormal or restricted ROM, activity intolerance, impaired physical strength, lacks appropriate home exercise program and pain with function   Functional limitations: lifting, sleeping, pulling, pushing, uncomfortable because of pain, reaching behind back, reaching overhead and unable to perform repetitive tasks   Assessment details: Mesha Sanchez is a 39 y.o. y/o female reporting to OP PT for an initial evaluation of the neck. Pt reports right-sided neck, elbow, and wrist pain beginning in about 6-7 months ago. She has a history of carpal tunnel in her right wrist. She feels cramping and numbness in her right hand. This pain is most noticeable while she is sleeping and often wakes her up. She has difficulty performing certain tasks at work depending on the position. She cannot write with her right hand and feels that she has significant decreases in strength and movement of the hand. At the time of visit she reports a majority of her symptoms in her elbow. Upon evaluation, she presents with decreased function of her right UE and limited ROM and strength of her neck. Her decreased function of her right UE was seen through her performance of UE 1 and 2 patterns in the SFMA. She demonstrated weakness and pain during MMT, although she had full ROM of both UE. Additionally,  she showed decreased  strength of her right hand during her  strength testing. She showed limitations in cervical rotation and extension during her ROM testing. Bilateral rotation and sidebending increased radicular symptoms into her right shoulder. She had positive right Spurling's, positive OK sign, and Froment's sign indicating potential nerve compression and/or radiculopathy. She presents with typical radicular symptoms. Skilled OP PT is deemed appropriate and necessary to address her cervical and UE mobility and strength deficits to help her return to prior levels of function and perform her ADLs without increased pain in her neck and UE. Pt did not schedule another visit due to high copay.  Prognosis: good    Goals  Plan Goals:   Short Term (4 weeks)  1. Pt will be compliant and independent with HEP  2. Pt will report 30% decrease in pain in neck, elbow, and hand for increased quality of life  3. Pt will demonstrate WNL ROM of neck in all planes of motion for indication of increased mobility during ADLs  4. Pt will tolerate implementation of progressive neck and scapular strengthening program for increased strength during ADLs  5. Pt will tolerate implementation of progressive right hand strengthening program for increased strength during ADLs  6. Pt will report being able to write one sentence with her right hand without increased pain for return to prior levels of function    Long Term (8 weeks)  1. Pt will be compliant and independent with progressed HEP  2. Pt will report 50% decrease in pain in neck, elbow, and hand for increased quality of life  3. Pt will demonstrate >/= 4+/5 MMT of neck and shoulders in all planes of motion for indication of increased strength during ADLs  4. Pt will demonstrate functional non-painful UE 1 and 2 patterns with her right UE for increased function during ADLs (UE 1: touch opposite inf. angle with back of hand; UE 2: touch opposite spine of scapula; All with no  winging of scapula or straining).  5. Pt will report being able to write one paragraph with her right hand without increased pain for return to prior levels of function      Plan  Therapy options: will be seen for skilled therapy services  Planned modality interventions: cryotherapy, thermotherapy (hydrocollator packs) and electrical stimulation/Russian stimulation  Planned therapy interventions: manual therapy, ADL retraining, neuromuscular re-education, motor coordination training, fine motor coordination training, soft tissue mobilization, spinal/joint mobilization, functional ROM exercises, strengthening, stretching, home exercise program, joint mobilization, therapeutic activities and flexibility  Frequency: 2x week  Duration in weeks: 8  Treatment plan discussed with: patient  Plan details: Waiting to hear back from patient to schedule.        Manual Therapy:    0     mins  83887;  Therapeutic Exercise:    10     mins  [id number];     Neuromuscular Delores:    0    mins  53002;    Therapeutic Activity:     10     mins  85092;     Gait Trainin     mins  [id number];     Ultrasound:     0     mins  69904;    Electrical Stimulation:    0     mins  04542 ( );  Dry Needling     0     mins self-pay    Timed Treatment:   20   mins   Total Treatment:     73   mins    PT SIGNATURE:   Chan Herrera, PT   [place], [facility]    DATE TREATMENT INITIATED: 2025    Initial Certification  Certification Period: 2025  I certify that the therapy services are furnished while this patient is under my care.  The services outlined above are required by this patient, and will be reviewed every 90 days.     PHYSICIAN: José Miguel Shin, LAKSHMI      DATE:     Please sign and return via fax to [phone].. Thank you, [facility].